# Patient Record
Sex: FEMALE | Race: WHITE | NOT HISPANIC OR LATINO | Employment: PART TIME | URBAN - METROPOLITAN AREA
[De-identification: names, ages, dates, MRNs, and addresses within clinical notes are randomized per-mention and may not be internally consistent; named-entity substitution may affect disease eponyms.]

---
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
## 2021-06-07 ENCOUNTER — OFFICE VISIT (OUTPATIENT)
Dept: INTERNAL MEDICINE | Facility: CLINIC | Age: 21
End: 2021-06-07

## 2021-06-07 VITALS
WEIGHT: 135.7 LBS | DIASTOLIC BLOOD PRESSURE: 62 MMHG | HEART RATE: 69 BPM | SYSTOLIC BLOOD PRESSURE: 130 MMHG | OXYGEN SATURATION: 99 % | HEIGHT: 67 IN | TEMPERATURE: 98.9 F | BODY MASS INDEX: 21.3 KG/M2

## 2021-06-07 DIAGNOSIS — H69.83 DYSFUNCTION OF BOTH EUSTACHIAN TUBES: ICD-10-CM

## 2021-06-07 DIAGNOSIS — Z78.9 ELECTRONIC CIGARETTE USE: ICD-10-CM

## 2021-06-07 DIAGNOSIS — F41.0 GENERALIZED ANXIETY DISORDER WITH PANIC ATTACKS: ICD-10-CM

## 2021-06-07 DIAGNOSIS — K21.9 GASTROESOPHAGEAL REFLUX DISEASE, UNSPECIFIED WHETHER ESOPHAGITIS PRESENT: ICD-10-CM

## 2021-06-07 DIAGNOSIS — R41.3 MEMORY PROBLEM: ICD-10-CM

## 2021-06-07 DIAGNOSIS — Z00.00 HEALTHCARE MAINTENANCE: Primary | ICD-10-CM

## 2021-06-07 DIAGNOSIS — J30.2 SEASONAL ALLERGIC RHINITIS, UNSPECIFIED TRIGGER: ICD-10-CM

## 2021-06-07 DIAGNOSIS — F33.1 MODERATE EPISODE OF RECURRENT MAJOR DEPRESSIVE DISORDER (HCC): ICD-10-CM

## 2021-06-07 DIAGNOSIS — J02.9 PHARYNGITIS, UNSPECIFIED ETIOLOGY: ICD-10-CM

## 2021-06-07 DIAGNOSIS — F41.1 GENERALIZED ANXIETY DISORDER WITH PANIC ATTACKS: ICD-10-CM

## 2021-06-07 PROBLEM — L08.9 WOUND INFECTION: Status: RESOLVED | Noted: 2018-05-30 | Resolved: 2021-06-07

## 2021-06-07 PROBLEM — F90.0 ADHD, PREDOMINANTLY INATTENTIVE TYPE: Status: ACTIVE | Noted: 2020-01-19

## 2021-06-07 PROBLEM — L03.116 CELLULITIS OF KNEE, LEFT: Status: RESOLVED | Noted: 2018-06-01 | Resolved: 2021-06-07

## 2021-06-07 PROBLEM — L02.416 ABSCESS OF LEFT KNEE: Status: RESOLVED | Noted: 2018-05-31 | Resolved: 2021-06-07

## 2021-06-07 PROBLEM — N92.6 ABNORMAL MENSES: Status: ACTIVE | Noted: 2020-01-19

## 2021-06-07 PROBLEM — M25.562 ACUTE PAIN OF LEFT KNEE: Status: RESOLVED | Noted: 2018-05-31 | Resolved: 2021-06-07

## 2021-06-07 PROBLEM — T14.8XXA WOUND INFECTION: Status: RESOLVED | Noted: 2018-05-30 | Resolved: 2021-06-07

## 2021-06-07 PROBLEM — Z97.5 NEXPLANON IN PLACE: Status: ACTIVE | Noted: 2020-05-24

## 2021-06-07 PROBLEM — L03.116 CELLULITIS OF LEFT KNEE: Status: RESOLVED | Noted: 2018-05-31 | Resolved: 2021-06-07

## 2021-06-07 LAB
EXPIRATION DATE: NORMAL
INTERNAL CONTROL: NORMAL
Lab: NORMAL
S PYO AG THROAT QL: NEGATIVE

## 2021-06-07 PROCEDURE — 99385 PREV VISIT NEW AGE 18-39: CPT | Performed by: NURSE PRACTITIONER

## 2021-06-07 PROCEDURE — 87880 STREP A ASSAY W/OPTIC: CPT | Performed by: NURSE PRACTITIONER

## 2021-06-07 RX ORDER — CITALOPRAM 10 MG/1
10 TABLET ORAL DAILY
Qty: 30 TABLET | Refills: 5 | Status: SHIPPED | OUTPATIENT
Start: 2021-06-07 | End: 2021-06-07

## 2021-06-07 RX ORDER — CITALOPRAM 10 MG/1
10 TABLET ORAL DAILY
Qty: 30 TABLET | Refills: 5 | Status: SHIPPED | OUTPATIENT
Start: 2021-06-07

## 2021-06-07 NOTE — PROGRESS NOTES
"Subjective   Socorro Chaney is a 21 y.o. female.     Chief Complaint   Patient presents with   • Anxiety     Pt here to est care as a new pt tand would like to discuss abt anxiety depression.   • Depression        History of Present Illness   She is here today as a new patient to establish care and for CPE. She feels her overall health is \"not the best.\" She tries to eat a healthy, balanced diet during the week. Exercising 3-4 days a week.   Previous PCP was a few years ago.  She has been having a sore throat and bilateral ear pain and fullness for the past several days. 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 She denies any fever, but has had chills and fatigue. She has a hx of seasonal AR, not currently taking medication.    Generalized Anxiety/Major depression- symptoms have been present for some time. She was previously on Wellbutrin with side effects of vivid dreams and zoloft without symptom improvement. She is having some anhedonia and lack of motivation. She does have occasional \"anxiety attacks.\" She has anxiety triggers with current roommate situation and with family. She wakes up several times a night. Denies any suicidal thoughts or michele.     Acid reflux- occurs 3-4 times a week. Tums does not improve symptoms completely. She tries to avoid trigger foods and eating before bed. Has occasional epigastric discomfort. Denies dysphagia, odynophagia, early satiety, unexplained weight loss.     GYN- sees Karina Joaquin with GYN. Nexplanon in place, since 2015. Hx of menstrual irregularities with heavy menstrual bleeding and painful menses. She is still having menses, LMP 05/28/2021. She has never had a pap. Sexually active, using condoms.     She works as a .    The following portions of the patient's history were reviewed and updated as appropriate: allergies, current medications, past family history, past medical history, past social history, past surgical history and problem " list.    Review of Systems   Constitutional: Positive for chills and fatigue. Negative for appetite change, diaphoresis, fever, unexpected weight gain and unexpected weight loss.   HENT: Positive for ear pain, postnasal drip, rhinorrhea and sore throat. Negative for congestion, dental problem, ear discharge, hearing loss, mouth sores, nosebleeds, sinus pressure, swollen glands, tinnitus and trouble swallowing.    Eyes: Negative for blurred vision, pain, discharge, redness, itching and visual disturbance.   Respiratory: Positive for cough (dry). Negative for chest tightness, shortness of breath and wheezing.    Cardiovascular: Negative for chest pain, palpitations and leg swelling.   Gastrointestinal: Positive for indigestion. Negative for abdominal distention, abdominal pain, blood in stool, constipation, diarrhea, nausea, vomiting and GERD.   Endocrine: Negative for cold intolerance and heat intolerance.   Genitourinary: Negative for breast discharge, breast lump, breast pain, difficulty urinating, dyspareunia, dysuria, flank pain, frequency, hematuria, pelvic pain, pelvic pressure, urgency, urinary incontinence, vaginal bleeding and vaginal discharge.   Musculoskeletal: Positive for back pain (chronic associated with scoliosis). Negative for arthralgias, gait problem, joint swelling, myalgias and neck pain.   Skin: Negative for color change, rash and skin lesions.   Allergic/Immunologic: Positive for environmental allergies. Negative for food allergies.   Neurological: Positive for memory problem (foggy thinking, difficulty focusing). Negative for dizziness, tremors, seizures, syncope, speech difficulty, weakness, light-headedness, numbness, headache and confusion.   Hematological: Negative for adenopathy. Does not bruise/bleed easily.   Psychiatric/Behavioral: Positive for sleep disturbance and depressed mood. Negative for suicidal ideas and stress. The patient is nervous/anxious.        Objective   Physical  Exam  Constitutional:       Appearance: Normal appearance. She is well-developed.   HENT:      Head: Normocephalic and atraumatic.      Right Ear: Hearing, ear canal and external ear normal. A middle ear effusion is present.      Left Ear: Hearing, ear canal and external ear normal. A middle ear effusion is present.      Nose: Septal deviation and rhinorrhea present. Rhinorrhea is clear.      Right Sinus: No maxillary sinus tenderness or frontal sinus tenderness.      Left Sinus: No maxillary sinus tenderness or frontal sinus tenderness.      Mouth/Throat:      Lips: Pink.      Mouth: Mucous membranes are moist.      Dentition: Normal dentition.      Tongue: No lesions.      Pharynx: Oropharynx is clear. Uvula midline.      Tonsils: No tonsillar exudate.      Comments: Posterior pharynx with cobbling    Eyes:      General: Lids are normal.      Extraocular Movements: Extraocular movements intact.      Conjunctiva/sclera: Conjunctivae normal.      Pupils: Pupils are equal, round, and reactive to light.   Neck:      Thyroid: No thyroid mass, thyromegaly or thyroid tenderness.      Vascular: No carotid bruit.      Trachea: Trachea normal.   Cardiovascular:      Rate and Rhythm: Normal rate and regular rhythm.      Pulses: Normal pulses.           Radial pulses are 2+ on the right side and 2+ on the left side.        Popliteal pulses are 2+ on the right side and 2+ on the left side.        Dorsalis pedis pulses are 2+ on the right side and 2+ on the left side.        Posterior tibial pulses are 2+ on the right side and 2+ on the left side.      Heart sounds: S1 normal and S2 normal.   Pulmonary:      Effort: Pulmonary effort is normal.      Breath sounds: Normal breath sounds.   Abdominal:      General: Bowel sounds are normal. There is no distension or abdominal bruit.      Palpations: Abdomen is soft. There is no hepatomegaly or splenomegaly.      Tenderness: There is no abdominal tenderness.      Hernia: No hernia is  present.   Musculoskeletal:      Cervical back: Normal range of motion and neck supple.      Right lower leg: No edema.      Left lower leg: No edema.   Lymphadenopathy:      Head:      Right side of head: No submental, submandibular, tonsillar or occipital adenopathy.      Left side of head: No submental, submandibular, tonsillar or occipital adenopathy.      Cervical: No cervical adenopathy.      Upper Body:      Right upper body: No supraclavicular adenopathy.      Left upper body: No supraclavicular adenopathy.      Lower Body: No right inguinal adenopathy. No left inguinal adenopathy.   Skin:     General: Skin is warm and dry.      Findings: No rash.      Nails: There is no clubbing.   Neurological:      Mental Status: She is alert and oriented to person, place, and time.      Cranial Nerves: Cranial nerves are intact.      Motor: Motor function is intact.      Coordination: Coordination is intact.      Gait: Gait is intact.      Deep Tendon Reflexes:      Reflex Scores:       Patellar reflexes are 2+ on the right side and 2+ on the left side.  Psychiatric:         Attention and Perception: Attention normal.         Mood and Affect: Mood and affect normal.         Speech: Speech normal.         Behavior: Behavior normal.         Thought Content: Thought content normal.         Cognition and Memory: Cognition normal.         Vitals:    06/07/21 1328   BP: 130/62   Pulse: 69   Temp: 98.9 °F (37.2 °C)   SpO2: 99%      Body mass index is 21.25 kg/m².    Assessment/Plan   Diagnoses and all orders for this visit:    1. Healthcare maintenance (Primary)  -     Discontinue: citalopram (CeleXA) 10 MG tablet; Take 1 tablet by mouth Daily.  Dispense: 30 tablet; Refill: 5  -     CBC & Differential  -     Comprehensive Metabolic Panel  -     Lipid Panel With LDL / HDL Ratio  -     TSH Rfx On Abnormal To Free T4  -     Vitamin B12    2. Memory problem  -     Discontinue: citalopram (CeleXA) 10 MG tablet; Take 1 tablet by  mouth Daily.  Dispense: 30 tablet; Refill: 5  -     CBC & Differential  -     Comprehensive Metabolic Panel  -     Lipid Panel With LDL / HDL Ratio  -     TSH Rfx On Abnormal To Free T4  -     Vitamin B12    3. Pharyngitis, unspecified etiology  -     Discontinue: citalopram (CeleXA) 10 MG tablet; Take 1 tablet by mouth Daily.  Dispense: 30 tablet; Refill: 5  -     CBC & Differential  -     Comprehensive Metabolic Panel  -     Lipid Panel With LDL / HDL Ratio  -     TSH Rfx On Abnormal To Free T4  -     Vitamin B12  -     POCT rapid strep A    4. Generalized anxiety disorder with panic attacks  -     Discontinue: citalopram (CeleXA) 10 MG tablet; Take 1 tablet by mouth Daily.  Dispense: 30 tablet; Refill: 5  -     CBC & Differential  -     Comprehensive Metabolic Panel  -     Lipid Panel With LDL / HDL Ratio  -     TSH Rfx On Abnormal To Free T4  -     Vitamin B12  -     citalopram (CeleXA) 10 MG tablet; Take 1 tablet by mouth Daily.  Dispense: 30 tablet; Refill: 5    5. Moderate episode of recurrent major depressive disorder (CMS/HCC)  -     Discontinue: citalopram (CeleXA) 10 MG tablet; Take 1 tablet by mouth Daily.  Dispense: 30 tablet; Refill: 5  -     CBC & Differential  -     Comprehensive Metabolic Panel  -     Lipid Panel With LDL / HDL Ratio  -     TSH Rfx On Abnormal To Free T4  -     Vitamin B12  -     citalopram (CeleXA) 10 MG tablet; Take 1 tablet by mouth Daily.  Dispense: 30 tablet; Refill: 5    6. Seasonal allergic rhinitis, unspecified trigger    7. Dysfunction of both eustachian tubes    8. Electronic cigarette use    9. Gastroesophageal reflux disease, unspecified whether esophagitis present        Patient screened positive for depression based on a PHQ-9 score of 16 on 6/7/2021. Follow-up recommendations include: Prescribed antidepressant medication treatment.    1. Preventative counseling- encouraged her to continue to work on healthy, balanced diet and regular exercise with goal of 150 minutes  moderate intensity activity a week. Ensure adequate dietary intake of calcium and vit D. Limit alcohol consumption to 1 or fewer drinks a day.  2. Generalized anxiety/Major depression- not controlled. Will try citalopram 10 mg once daily. Aware of appropriate use and a/e. To ER with SI or michele. F/u in 3-4 weeks for medication check.  3. Seasonal AR/Eustachian tube dysfunction- strep test negative. I suspect sore throat is related to PND. Recommend starting NSS 2 puffs daily. Can also try addition of antihistamine at bedtime. Notify for worsening symptoms.  4.Electronic cigarette use- encouraged her to quit. She is ready to do so. Discussed the effects on her AR and acid reflux.   5. Acid reflux- recommend trying daily omeprazole for 3-4 weeks, avoid trigger foods and discussed reflux precautions. Encouraged smoking and alcohol cessation. Notify if symptoms do not improve.    6. Memory problem- check labs today. ?side effect of anxiety and depression.    Dentist- UTD  GYN- due, she will schedule this  Wears sunscreen outside  “Discussed risks/benefits to vaccination, reviewed components of the vaccine, discussed VIS, discussed informed consent, informed consent obtained. Patient/Parent was allowed to accept or refuse vaccine. Questions answered to satisfactory state of patient/Parent. We reviewed typical age appropriate and seasonally appropriate vaccinations. Reviewed immunization history and updated state vaccination form as needed. Patient was counseled on HPV  Tdap  COVID    Non-fasting labs today. F/u in 3 weeks for anxiety/depression.

## 2021-06-08 LAB
ALBUMIN SERPL-MCNC: 5 G/DL (ref 3.5–5.2)
ALBUMIN/GLOB SERPL: 2.6 G/DL
ALP SERPL-CCNC: 75 U/L (ref 39–117)
ALT SERPL-CCNC: 17 U/L (ref 1–33)
AST SERPL-CCNC: 15 U/L (ref 1–32)
BASOPHILS # BLD AUTO: 0.06 10*3/MM3 (ref 0–0.2)
BASOPHILS NFR BLD AUTO: 0.9 % (ref 0–1.5)
BILIRUB SERPL-MCNC: 0.3 MG/DL (ref 0–1.2)
BUN SERPL-MCNC: 15 MG/DL (ref 6–20)
BUN/CREAT SERPL: 21.1 (ref 7–25)
CALCIUM SERPL-MCNC: 9.9 MG/DL (ref 8.6–10.5)
CHLORIDE SERPL-SCNC: 104 MMOL/L (ref 98–107)
CHOLEST SERPL-MCNC: 181 MG/DL (ref 0–200)
CO2 SERPL-SCNC: 25.7 MMOL/L (ref 22–29)
CREAT SERPL-MCNC: 0.71 MG/DL (ref 0.57–1)
EOSINOPHIL # BLD AUTO: 0.25 10*3/MM3 (ref 0–0.4)
EOSINOPHIL NFR BLD AUTO: 3.8 % (ref 0.3–6.2)
ERYTHROCYTE [DISTWIDTH] IN BLOOD BY AUTOMATED COUNT: 11.8 % (ref 12.3–15.4)
GLOBULIN SER CALC-MCNC: 1.9 GM/DL
GLUCOSE SERPL-MCNC: 87 MG/DL (ref 65–99)
HCT VFR BLD AUTO: 43.1 % (ref 34–46.6)
HDLC SERPL-MCNC: 61 MG/DL (ref 40–60)
HGB BLD-MCNC: 14.5 G/DL (ref 12–15.9)
IMM GRANULOCYTES # BLD AUTO: 0.02 10*3/MM3 (ref 0–0.05)
IMM GRANULOCYTES NFR BLD AUTO: 0.3 % (ref 0–0.5)
LDLC SERPL CALC-MCNC: 103 MG/DL (ref 0–100)
LDLC/HDLC SERPL: 1.67 {RATIO}
LYMPHOCYTES # BLD AUTO: 1.58 10*3/MM3 (ref 0.7–3.1)
LYMPHOCYTES NFR BLD AUTO: 24 % (ref 19.6–45.3)
MCH RBC QN AUTO: 30.9 PG (ref 26.6–33)
MCHC RBC AUTO-ENTMCNC: 33.6 G/DL (ref 31.5–35.7)
MCV RBC AUTO: 91.9 FL (ref 79–97)
MONOCYTES # BLD AUTO: 0.44 10*3/MM3 (ref 0.1–0.9)
MONOCYTES NFR BLD AUTO: 6.7 % (ref 5–12)
NEUTROPHILS # BLD AUTO: 4.22 10*3/MM3 (ref 1.7–7)
NEUTROPHILS NFR BLD AUTO: 64.3 % (ref 42.7–76)
NRBC BLD AUTO-RTO: 0 /100 WBC (ref 0–0.2)
PLATELET # BLD AUTO: 305 10*3/MM3 (ref 140–450)
POTASSIUM SERPL-SCNC: 4 MMOL/L (ref 3.5–5.2)
PROT SERPL-MCNC: 6.9 G/DL (ref 6–8.5)
RBC # BLD AUTO: 4.69 10*6/MM3 (ref 3.77–5.28)
SODIUM SERPL-SCNC: 136 MMOL/L (ref 136–145)
TRIGL SERPL-MCNC: 91 MG/DL (ref 0–150)
TSH SERPL DL<=0.005 MIU/L-ACNC: 1.58 UIU/ML (ref 0.27–4.2)
VIT B12 SERPL-MCNC: 315 PG/ML (ref 211–946)
VLDLC SERPL CALC-MCNC: 17 MG/DL (ref 5–40)
WBC # BLD AUTO: 6.57 10*3/MM3 (ref 3.4–10.8)

## 2021-06-10 ENCOUNTER — TELEPHONE (OUTPATIENT)
Dept: INTERNAL MEDICINE | Facility: CLINIC | Age: 21
End: 2021-06-10

## 2021-06-24 ENCOUNTER — HOSPITAL ENCOUNTER (EMERGENCY)
Facility: HOSPITAL | Age: 21
Discharge: HOME | End: 2021-06-24
Attending: EMERGENCY MEDICINE | Admitting: EMERGENCY MEDICINE

## 2021-06-24 VITALS
HEART RATE: 78 BPM | OXYGEN SATURATION: 100 % | SYSTOLIC BLOOD PRESSURE: 101 MMHG | WEIGHT: 130 LBS | HEIGHT: 67 IN | BODY MASS INDEX: 20.4 KG/M2 | TEMPERATURE: 98.8 F | RESPIRATION RATE: 16 BRPM | DIASTOLIC BLOOD PRESSURE: 76 MMHG

## 2021-06-24 DIAGNOSIS — M94.0 TIETZE SYNDROME: Primary | ICD-10-CM

## 2021-06-24 LAB — QT INTERVAL: 378 MS

## 2021-06-24 PROCEDURE — 99283 EMERGENCY DEPT VISIT LOW MDM: CPT

## 2021-06-24 PROCEDURE — 93010 ELECTROCARDIOGRAM REPORT: CPT | Performed by: INTERNAL MEDICINE

## 2021-06-24 PROCEDURE — 93005 ELECTROCARDIOGRAM TRACING: CPT

## 2021-06-24 RX ORDER — LIDOCAINE 50 MG/G
1 PATCH TOPICAL ONCE
Status: DISCONTINUED | OUTPATIENT
Start: 2021-06-24 | End: 2021-06-24 | Stop reason: HOSPADM

## 2021-06-24 RX ORDER — LIDOCAINE 50 MG/G
1 PATCH TOPICAL EVERY 24 HOURS
Qty: 30 PATCH | Refills: 0 | Status: SHIPPED | OUTPATIENT
Start: 2021-06-24

## 2021-06-24 RX ORDER — IBUPROFEN 600 MG/1
600 TABLET ORAL EVERY 6 HOURS PRN
Qty: 30 TABLET | Refills: 0 | Status: SHIPPED | OUTPATIENT
Start: 2021-06-24

## 2021-06-24 RX ADMIN — LIDOCAINE 1 PATCH: 50 PATCH TOPICAL at 20:22

## 2022-05-13 ENCOUNTER — TELEPHONE (OUTPATIENT)
Dept: INTERNAL MEDICINE | Facility: CLINIC | Age: 22
End: 2022-05-13

## 2022-05-18 ENCOUNTER — TELEPHONE (OUTPATIENT)
Dept: INTERNAL MEDICINE | Facility: CLINIC | Age: 22
End: 2022-05-18

## 2022-05-18 DIAGNOSIS — F41.1 GENERALIZED ANXIETY DISORDER WITH PANIC ATTACKS: Primary | ICD-10-CM

## 2022-05-18 DIAGNOSIS — F41.0 GENERALIZED ANXIETY DISORDER WITH PANIC ATTACKS: Primary | ICD-10-CM

## 2022-05-18 NOTE — TELEPHONE ENCOUNTER
----- Message from ROBERT Ivan sent at 5/18/2022  8:09 AM EDT -----  Ok to place referral to [facility].   ----- Message -----  From: Velia Emerson  Sent: 5/18/2022   7:07 AM EDT  To: ROBERT Ivan      ----- Message -----  From: Alma Rosa Mckeon RegSched Rep  Sent: 5/17/2022   3:49 PM EDT  To: Velia Emerson    Mother called on 05/13/22:      PATIENTS MOTHER CALLED IN TO REQUEST A REFERRAL TO [facility] FOR A THERAPIST.

## 2023-07-26 ENCOUNTER — OFFICE VISIT (OUTPATIENT)
Dept: INTERNAL MEDICINE | Facility: CLINIC | Age: 23
End: 2023-07-26
Payer: COMMERCIAL

## 2023-07-26 VITALS
SYSTOLIC BLOOD PRESSURE: 110 MMHG | BODY MASS INDEX: 22.38 KG/M2 | OXYGEN SATURATION: 98 % | TEMPERATURE: 98.3 F | HEART RATE: 98 BPM | WEIGHT: 142.6 LBS | DIASTOLIC BLOOD PRESSURE: 62 MMHG | HEIGHT: 67 IN

## 2023-07-26 DIAGNOSIS — H60.393 OTHER INFECTIVE ACUTE OTITIS EXTERNA OF BOTH EARS: ICD-10-CM

## 2023-07-26 DIAGNOSIS — K59.00 CONSTIPATION, UNSPECIFIED CONSTIPATION TYPE: ICD-10-CM

## 2023-07-26 DIAGNOSIS — M54.2 NECK PAIN: Primary | ICD-10-CM

## 2023-07-26 PROBLEM — B00.9 HSV-1 (HERPES SIMPLEX VIRUS 1) INFECTION: Chronic | Status: ACTIVE | Noted: 2021-09-29

## 2023-07-26 PROCEDURE — 99214 OFFICE O/P EST MOD 30 MIN: CPT | Performed by: NURSE PRACTITIONER

## 2023-07-26 RX ORDER — CIPROFLOXACIN AND DEXAMETHASONE 3; 1 MG/ML; MG/ML
4 SUSPENSION/ DROPS AURICULAR (OTIC) 2 TIMES DAILY
Qty: 1 EACH | Refills: 0 | Status: SHIPPED | OUTPATIENT
Start: 2023-07-26

## 2023-07-26 NOTE — PROGRESS NOTES
Subjective   Socorro Chaney is a 23 y.o. female.     Chief Complaint   Patient presents with    Neck Pain     Pt c/o pain on base of neck and get worse with different movements X 1.5-2 months.    Abdominal Pain    Constipation     Pt c/o constipation and abdominal pain X 1 year.        History of Present Illness   She is here today with complaints of neck pain.  Symptoms started approximately 2 months ago. She initially thought that she had slept wrong and tried different pillows and positions without improvement. Pain is constant, worse with flexion and extension of the neck. Pain is worse in the morning, improves some throughout the the day. Pain will occasionally radiate down to the sides of the neck and between shoulder blades.   She tried ibuprofen once with minimal improvement.   She denies any trauma, paresthesias, extremity weakness.     She also notes constipation and intermittent abdominal discomfort. This started approximately 1 year ago. She notes alternating bowel habits with constipation and occasional episodes of diarrhea. She notes lower abdominal cramping with BM. She will typically have a BM daily. This can be hard associated with straining or very soft and occur shortly after eating.  She notes wine is a trigger food.  She feels like constipation is worse when eating dairy.  She denies any BRBPR, melena.    She also has been having bilateral ear itching and drainage. She notes some aching in the morning.  Symptoms started approximately 1 month ago in the right ear and then progressed to the left ear.  She has been swimming often and [place] the [place].  She typically wears ear buds at work, but has transition to wearing headphones. She notes some allergy symptoms. She has tried peroxide without improvement.     The following portions of the patient's history were reviewed and updated as appropriate: allergies, current medications, past family history, past medical history, past social history,  past surgical history, and problem list.    Review of Systems   Constitutional:  Negative for chills, fatigue and fever.   HENT:  Positive for congestion, ear discharge, ear pain and postnasal drip. Negative for hearing loss.    Respiratory: Negative.     Cardiovascular: Negative.    Gastrointestinal:  Positive for abdominal distention, abdominal pain, constipation and diarrhea. Negative for anal bleeding, blood in stool, nausea and vomiting.   Musculoskeletal:  Positive for neck pain.   Neurological:  Negative for weakness and numbness.     Objective   Physical Exam  Constitutional:       Appearance: She is well-developed.   HENT:      Head: Normocephalic and atraumatic.      Right Ear: Hearing normal. Drainage present.      Left Ear: Hearing normal. Drainage present.      Ears:      Comments: Fluid present bilateral TM.     Nose: Rhinorrhea present. Rhinorrhea is clear.      Right Turbinates: Not enlarged.      Left Turbinates: Not enlarged.      Mouth/Throat:      Lips: Pink.      Mouth: Mucous membranes are moist. No injury or oral lesions.      Dentition: Normal dentition.      Tongue: No lesions. Tongue does not deviate from midline.      Palate: No mass and lesions.      Pharynx: Uvula midline. No pharyngeal swelling, oropharyngeal exudate, posterior oropharyngeal erythema or uvula swelling.      Comments: Posterior pharynx with clear drainage.  Neck:      Thyroid: No thyroid mass, thyromegaly or thyroid tenderness.      Vascular: No carotid bruit.      Trachea: Trachea normal.   Cardiovascular:      Rate and Rhythm: Normal rate and regular rhythm.      Chest Wall: PMI is not displaced.      Pulses:           Radial pulses are 2+ on the right side and 2+ on the left side.        Dorsalis pedis pulses are 2+ on the right side and 2+ on the left side.        Posterior tibial pulses are 2+ on the right side and 2+ on the left side.      Heart sounds: S1 normal and S2 normal.   Pulmonary:      Effort: Pulmonary  effort is normal.      Breath sounds: Normal breath sounds.   Abdominal:      General: Bowel sounds are normal. There is no distension or abdominal bruit. There are no signs of injury.      Palpations: Abdomen is soft. There is no hepatomegaly or splenomegaly.      Tenderness: There is no abdominal tenderness. There is no guarding or rebound. Negative signs include Delgado's sign.      Hernia: No hernia is present.   Musculoskeletal:      Cervical back: Tenderness present. No swelling, edema, deformity, erythema, signs of trauma, lacerations, rigidity, spasms, torticollis, bony tenderness or crepitus. Pain with movement present. Normal range of motion.      Thoracic back: Normal.      Lumbar back: Normal.      Right lower leg: No edema.      Left lower leg: No edema.   Lymphadenopathy:      Head:      Right side of head: No submental, submandibular, tonsillar or occipital adenopathy.      Left side of head: No submental, submandibular, tonsillar or occipital adenopathy.      Cervical: No cervical adenopathy.   Skin:     General: Skin is warm and dry.      Capillary Refill: Capillary refill takes less than 2 seconds.      Nails: There is no clubbing.   Neurological:      Mental Status: She is alert and oriented to person, place, and time.   Psychiatric:         Attention and Perception: Attention normal.         Mood and Affect: Mood and affect normal.         Speech: Speech normal.         Behavior: Behavior normal.         Thought Content: Thought content normal.         Cognition and Memory: Cognition normal.       Vitals:    07/26/23 0901   BP: 110/62   Pulse: 98   Temp: 98.3 °F (36.8 °C)   SpO2: 98%      Body mass index is 22.33 kg/m².    Assessment & Plan   Problems Addressed this Visit    None  Visit Diagnoses       Neck pain    -  Primary    Relevant Orders    Ambulatory Referral to Physical Therapy Evaluate and treat    Other infective acute otitis externa of both ears        Relevant Medications     ciprofloxacin-dexamethasone (Ciprodex) 0.3-0.1 % otic suspension    Constipation, unspecified constipation type              Diagnoses         Codes Comments    Neck pain    -  Primary ICD-10-CM: M54.2  ICD-9-CM: 723.1     Other infective acute otitis externa of both ears     ICD-10-CM: H60.393  ICD-9-CM: 380.10     Constipation, unspecified constipation type     ICD-10-CM: K59.00  ICD-9-CM: 564.00           1.  Neck pain-recommend starting NSAIDs daily for 2 weeks.  Take with food, hydrate well with fluids.  Recommend application of heat and ice, gentle stretching.  Referral placed to physical therapy for further evaluation.  If no improvement with conservative treatment recommend x-ray imaging of the cervical spine.  2.  Constipation-?  IBS.  Recommend starting a daily fiber supplement and probiotic and increasing water intake.  Recommend keeping a food diary to identify trigger foods.  Also discussed trying elimination diet from dairy to see if symptoms improve.  Follow-up in 4 to 6 weeks.  If no improvement recommend referral to GI.  3.  Infective acute otitis externa of both ears-start Ciprodex otic suspension 4 drops in both the ears x7 days.  Also recommend starting a nasal steroid spray will 1 to 2 puffs in each nostril daily.  Follow-up in 4 to 6 weeks.

## 2023-08-07 ENCOUNTER — TREATMENT (OUTPATIENT)
Dept: PHYSICAL THERAPY | Facility: CLINIC | Age: 23
End: 2023-08-07
Payer: COMMERCIAL

## 2023-08-07 DIAGNOSIS — M54.6 CHRONIC BILATERAL THORACIC BACK PAIN: ICD-10-CM

## 2023-08-07 DIAGNOSIS — G89.29 CHRONIC BILATERAL THORACIC BACK PAIN: ICD-10-CM

## 2023-08-07 DIAGNOSIS — M54.2 PAIN, NECK: Primary | ICD-10-CM

## 2023-08-07 NOTE — PROGRESS NOTES
"Physical Therapy Initial Evaluation and Plan of Care      [address] [place]   [address]    Patient: Socorro Chaney   : 2000  Diagnosis/ICD-10 Code:  Pain, neck [M54.2]; chronic (B) thoracic back pain M54.6  Referring practitioner: ROBERT Ivan  Date of Initial Visit: 2023  Today's Date: 2023  Patient seen for 1 sessions           Subjective Questionnaire: NDI:13/50 indicates 26% functional impairment       Subjective Evaluation    History of Present Illness  Onset date: about 2 months ago.  Mechanism of injury: - Pt report onset of neck pain about 2 months ago  - Pt reported she began gradually noticing pain which she originally attributed to camping or sleep position  - Pt has h/o of  earache, ear infection, fluid in ears, difficulty hearing from (L) ear.   - Headaches tend to be frontal and temporal areas.  - Suboccipital pain \" is achy\".   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  Patient wears headset for making call Pain  Current pain rating: 3  At best pain ratin  At worst pain ratin  Location: posterior neck; intermittent scapular pain, mid back pain  Aggravating factors: sleeping and prolonged positioning (reading, prolonged positioning, worst first thing in the morning)    Social Support  Lives in: apartment  Lives with: roommates.       Copied from 2019 x ray results:    IMPRESSION:   1. Mild thoracic scoliosis.   2. Known right L5 pars interarticularis defect, somewhat less than optimally displayed on the current study.   3. Stable mild degenerative disc disease at L4-L5.   PMHx: anxiety, depression, spondylolisthesis  Objective          Static Posture     Head  Forward.    Scapulae  Left winging, right winging and right protracted.    Comments  Pt appears to have flattening of " cervical lordosis    Postural Observations  Correction of posture: makes symptoms better    Additional Postural Observation Details  With lumbar support cushion and towel roll at thoracic spine     Palpation   Left   Hypertonic in the cervical paraspinals, scalenes, sternocleidomastoid and upper trapezius.   Tenderness of the cervical paraspinals, scalenes, sternocleidomastoid and upper trapezius.     Right   Hypertonic in the cervical paraspinals, scalenes, sternocleidomastoid and upper trapezius. Tenderness of the cervical paraspinals, scalenes, sternocleidomastoid and upper trapezius.     Active Range of Motion   Cervical/Thoracic Spine   Cervical    Flexion: WFL and with pain  Extension: 70 degrees   Left lateral flexion: 31 degrees with pain  Right lateral flexion: 35 degrees   Left rotation: 73 degrees   Right rotation: 70 degrees with pain    Additional Active Range of Motion Details  (B) shoulder AROM grossly WNL    Strength/Myotome Testing     Left Shoulder     Planes of Motion   Flexion: 4   Abduction: 4+     Isolated Muscles   Lower trapezius: 3   Middle trapezius: 3+     Right Shoulder     Planes of Motion   Flexion: 4   Abduction: 4+     Isolated Muscles   Lower trapezius: 3   Middle trapezius: 3+     Left Elbow   Flexion: 5  Extension: 5    Right Elbow   Flexion: 5  Extension: 5        Assessment & Plan     Assessment  Impairments: abnormal muscle tone, abnormal or restricted ROM, impaired physical strength, lacks appropriate home exercise program and pain with function  Functional Limitations: carrying objects, lifting, sleeping, uncomfortable because of pain, moving in bed, sitting and unable to perform repetitive tasks  Assessment details: Pt presents to PT with medical diagnosis of  neck pain  .  Patient reported gradual onset about 2 months ago.    Due to impairments and recent pain patterns, pt having difficulty sleeping, reading, looking at phone and performing job duties (including primary use  of computer)     Pt's job consists of sitting at a computer with multiple monitors.  Therapist discussed proper ergonomic set up of work station including monitor at neutral/eye level to avoid prolonged rotation, proper chair height, hip/knees at 90/90 position and lumbar support.  Patient verbalized understanding.       Pt would benefit from skilled PT intervention to address the deficits noted to maximize function, decrease pain and improve restorative sleep pattern .        Barriers to therapy: job related duties/positioning; chronic spine pain  Prognosis: good    Goals  Plan Goals: SHORT TERM GOALS: Time for goal Achievement: 3 weeks  1. Pt can demonstrate initial HEP for self management of impairments.  2. Pt reports at least 50% improvement overall, improved neck AROM and less pain.  3. Pt reports improve quality sleep, less interference from neck pain with ability to sleep 6-8 hours uninterrupted by pain     LONG TERM  GOALS: Time for goal achievement: 6 weeks  1. NDI score <19% by DC indicating improved function.  2. Neck rotation improved to 80 degrees or better for improved safety when driving.  3. Pt reports she  is ready for DC to Independent HEP for self management of her condition and to prevent recurrence   4.Pt will demonstrate (B) mid and lower trap strength = 4/5 for improved postural support and decreased spinal stress    Plan  Therapy options: will be seen for skilled therapy services  Planned modality interventions: cryotherapy, hydrotherapy, electrical stimulation/Russian stimulation, high voltage pulsed current (pain management), ultrasound, dry needling and traction  Other planned modality interventions: mechanical cervical traction  Planned therapy interventions: manual therapy, joint mobilization, home exercise program, functional ROM exercises, flexibility, strengthening, spinal/joint mobilization, soft tissue mobilization, postural training, therapeutic activities, stretching,  neuromuscular re-education and body mechanics training  Frequency: 1x week  Duration in visits: 6  Duration in weeks: 6  Treatment plan discussed with: patient  Plan details: Trialed dry needling this date for efficacy of pain/symptom reduction.        History # of Personal Factors and/or Comorbidities: MODERATE (1-2)  Examination of Body System(s): # of elements: MODERATE (3)  Clinical Presentation: EVOLVING  Clinical Decision Making: MODERATE      Timed:         Manual Therapy:    0     mins  64570;     Therapeutic Exercise:    0     mins  75866;     Neuromuscular Keyla:    0    mins  73338;    Therapeutic Activity:     0     mins  27937;     Gait Trainin     mins  72887;     Ultrasound:     0     mins  33914;    Ionto                               0    mins   17349  Self Care                       0     mins   81733  Aquatic                          0     mins 97445      Un-Timed:  Electrical Stimulation:    0     mins  32290 ( );  Dry Needling     0     mins self-pay  Traction     0     mins 94711  Low Eval     0     Mins  52524  Mod Eval     40     Mins  72185  High Eval                       0     Mins  29168  Re-Eval                           0    mins  48785  Dry Needling Trial 15 mins DRYNDLTRIAL      Timed Treatment:   0   mins   Total Treatment:     55   mins    PT SIGNATURE: Mey Mccauley PT   [place] License#: [id number]      Electronically signed by Mey Mccauley PT, 23, 3:01 PM EDT      Initial Certification  Certification Period:  2023 thru 2023  I certify that the therapy services are furnished while this patient is under my care.  The services outlined above are required by this patient, and will be reviewed every 90 days.       Physician Signature:__________________________________________________    PHYSICIAN: Parvin Cehung APRN      DATE:     Please sign and return via fax to [phone].. Thank you, [facility].

## 2023-08-14 ENCOUNTER — TREATMENT (OUTPATIENT)
Dept: PHYSICAL THERAPY | Facility: CLINIC | Age: 23
End: 2023-08-14
Payer: COMMERCIAL

## 2023-08-14 DIAGNOSIS — M54.2 PAIN, NECK: Primary | ICD-10-CM

## 2023-08-14 DIAGNOSIS — M54.6 CHRONIC BILATERAL THORACIC BACK PAIN: ICD-10-CM

## 2023-08-14 DIAGNOSIS — G89.29 CHRONIC BILATERAL THORACIC BACK PAIN: ICD-10-CM

## 2023-08-14 NOTE — PROGRESS NOTES
Physical Therapy Treatment  Note  [address] [place]   [address]     Diagnosis Plan   1. Pain, neck        2. Chronic bilateral thoracic back pain            VISIT#: 2    Subjective   Socorro Chaney reports: waking with increased pain today.  Patient rated neck pain and in between shoulder blades as 5-6/10       Objective     See Exercise, Manual, and Modality Logs for complete treatment.     Patient Education:  Access Code: [email or web address]  URL: [email or web address]  Date: 2023  Prepared by: Mey Mccauley    Exercises  - Sternocleidomastoid Stretch  - 1 x daily - 7 x weekly - 3 sets - 20 hold    Additional HEP exercises as noted on flow sheet  Goals  Plan Goals: SHORT TERM GOALS: Time for goal Achievement: 3 weeks  1. Pt can demonstrate initial HEP for self management of impairments.  2. Pt reports at least 50% improvement overall, improved neck AROM and less pain.  3. Pt reports improve quality sleep, less interference from neck pain with ability to sleep 6-8 hours uninterrupted by pain      LONG TERM  GOALS: Time for goal achievement: 6 weeks  1. NDI score <19% by DC indicating improved function.  2. Neck rotation improved to 80 degrees or better for improved safety when driving.  3. Pt reports she  is ready for DC to Independent HEP for self management of her condition and to prevent recurrence   4.Pt will demonstrate (B) mid and lower trap strength = 4/5 for improved postural support and decreased spinal stress    Assessment/Plan  - HEP handout issued this date.  Patient was able to return demo of all exercises  - Pt denied increased pain at conclusion of session but reported her muscles felt as if she had done something.   Progress per Plan of Care            Timed:         Manual Therapy:    0     mins  64719;     Therapeutic Exercise:    40     mins  34364;     Neuromuscular Keyla:    0    mins  56324;    Therapeutic Activity:     0     mins  14981;     Gait Trainin     mins   69206;     Ultrasound:     0     mins  39816;    Ionto                               0    mins   67422  Self Care                       0     mins   52182  Canalith Repos               0    mins  4209  Aquatic                          0     mins 95677    Un-Timed:  Electrical Stimulation:    0     mins  55857 ( );  Dry Needling     0     mins self-pay  Traction     0     mins 42681  Low Eval     0     Mins  85252  Mod Eval     0     Mins  87801  High Eval                       0     Mins  92941  Re-Eval                           0    mins  30267    Timed Treatment:   40   mins   Total Treatment:     40   mins    Mey Mccauley, PT PT, DPT, [id number]

## 2023-08-21 ENCOUNTER — TREATMENT (OUTPATIENT)
Dept: PHYSICAL THERAPY | Facility: CLINIC | Age: 23
End: 2023-08-21
Payer: COMMERCIAL

## 2023-08-21 DIAGNOSIS — M54.2 PAIN, NECK: Primary | ICD-10-CM

## 2023-08-21 DIAGNOSIS — M54.6 CHRONIC BILATERAL THORACIC BACK PAIN: ICD-10-CM

## 2023-08-21 DIAGNOSIS — G89.29 CHRONIC BILATERAL THORACIC BACK PAIN: ICD-10-CM

## 2023-08-21 NOTE — PROGRESS NOTES
Physical Therapy Treatment  Note  [address] [place]   [address]     Diagnosis Plan   1. Pain, neck        2. Chronic bilateral thoracic back pain            VISIT#: 3    Subjective   Socorro Chaney reports: pain is improving with exception of when she is sitting at work station at home.      Objective     See Exercise, Manual, and Modality Logs for complete treatment.     Goals  Plan Goals: SHORT TERM GOALS: Time for goal Achievement: 3 weeks  1. Pt can demonstrate initial HEP for self management of impairments.  2. Pt reports at least 50% improvement overall, improved neck AROM and less pain.  3. Pt reports improve quality sleep, less interference from neck pain with ability to sleep 6-8 hours uninterrupted by pain      LONG TERM  GOALS: Time for goal achievement: 6 weeks  1. NDI score <19% by DC indicating improved function.  2. Neck rotation improved to 80 degrees or better for improved safety when driving.  3. Pt reports she  is ready for DC to Independent HEP for self management of her condition and to prevent recurrence   4.Pt will demonstrate (B) mid and lower trap strength = 4/5 for improved postural support and decreased spinal stress    Assessment/Plan  -Addition stretching and strengthening activities added this date to address noted impairments.  - Pt denied pain at conclusion of session except when turning head to right.  Pt rated pain as 2-3/10 when turning head in this direction   - Pt reported fatigue of upper extremities with strengthening activities.  Encouraged pt to add prone and row scapular strengthening to fitness routine.   Progress per Plan of Care            Timed:         Manual Therapy:    15     mins  47825;     Therapeutic Exercise:    30     mins  59375;     Neuromuscular Keyla:    0    mins  41926;    Therapeutic Activity:     0     mins  64819;     Gait Trainin     mins  36696;     Ultrasound:     0     mins  67555;    Ionto                               0     mins   13338  Self Care                       0     mins   25162  Canalith Repos               0    mins  4209  Aquatic                          0     mins 04552    Un-Timed:  Electrical Stimulation:    0     mins  50427 ( );  Dry Needling     0     mins self-pay  Traction     0     mins 93383  Low Eval     0     Mins  88771  Mod Eval     0     Mins  24488  High Eval                       0     Mins  72111  Re-Eval                           0    mins  36854    Timed Treatment:   45   mins   Total Treatment:     45   mins    Mey Mccauley, PT PT, DPT, [id number]

## 2023-08-28 ENCOUNTER — TREATMENT (OUTPATIENT)
Dept: PHYSICAL THERAPY | Facility: CLINIC | Age: 23
End: 2023-08-28
Payer: COMMERCIAL

## 2023-08-28 DIAGNOSIS — G89.29 CHRONIC BILATERAL THORACIC BACK PAIN: ICD-10-CM

## 2023-08-28 DIAGNOSIS — M54.6 CHRONIC BILATERAL THORACIC BACK PAIN: ICD-10-CM

## 2023-08-28 DIAGNOSIS — M54.2 PAIN, NECK: Primary | ICD-10-CM

## 2023-08-28 NOTE — PROGRESS NOTES
Physical Therapy Treatment  Note  [address] [place]   [address]     Diagnosis Plan   1. Pain, neck        2. Chronic bilateral thoracic back pain            VISIT#: 4    Subjective   Socorro Chaney reports: pain provocation occurs when first waking in the morning and with cervical rotation.  Pain levels up to 4-5/10.  Patient denied pain upon arrival to therapy.     Objective     See Exercise, Manual, and Modality Logs for complete treatment.     Goals  Plan Goals: SHORT TERM GOALS: Time for goal Achievement: 3 weeks  1. Pt can demonstrate initial HEP for self management of impairments.  2. Pt reports at least 50% improvement overall, improved neck AROM and less pain.  3. Pt reports improve quality sleep, less interference from neck pain with ability to sleep 6-8 hours uninterrupted by pain      LONG TERM  GOALS: Time for goal achievement: 6 weeks  1. NDI score <19% by DC indicating improved function.  2. Neck rotation improved to 80 degrees or better for improved safety when driving.  3. Pt reports she  is ready for DC to Independent HEP for self management of her condition and to prevent recurrence   4.Pt will demonstrate (B) mid and lower trap strength = 4/5 for improved postural support and decreased spinal stress    Assessment/Plan  -Manual techniques performed to promote improved tissue mobility and motion   - Pt denied pain at conclusion of session except when turning head to right.  Pt rated pain as 4-5/10 when turning head in this direction   - Added prone chin tuck with retraction to existing HEP.  Handout provided.   Progress per Plan of Care            Timed:         Manual Therapy:    25     mins  09810;     Therapeutic Exercise:    10     mins  98854;     Neuromuscular Keyla:    0    mins  72480;    Therapeutic Activity:     0     mins  55284;     Gait Trainin     mins  00267;     Ultrasound:     0     mins  64531;    Ionto                               0    mins   00890  Self  Care                       0     mins   85347  Canalith Repos               0    mins  4209  Aquatic                          0     mins 37059    Un-Timed:  Electrical Stimulation:    0     mins  53832 ( );  Dry Needling     0     mins self-pay  Traction     0     mins 52755  Low Eval     0     Mins  34228  Mod Eval     0     Mins  33010  High Eval                       0     Mins  77300  Re-Eval                           0    mins  29163    Timed Treatment:   35   mins   Total Treatment:     35   mins    Mey Mccauley, PT PT, DPT, [id number]

## 2023-09-05 ENCOUNTER — TREATMENT (OUTPATIENT)
Dept: PHYSICAL THERAPY | Facility: CLINIC | Age: 23
End: 2023-09-05
Payer: COMMERCIAL

## 2023-09-05 DIAGNOSIS — M54.6 CHRONIC BILATERAL THORACIC BACK PAIN: ICD-10-CM

## 2023-09-05 DIAGNOSIS — G89.29 CHRONIC BILATERAL THORACIC BACK PAIN: ICD-10-CM

## 2023-09-05 DIAGNOSIS — M54.2 PAIN, NECK: Primary | ICD-10-CM

## 2023-09-05 DIAGNOSIS — E53.8 LOW SERUM VITAMIN B12: ICD-10-CM

## 2023-09-05 DIAGNOSIS — Z00.00 HEALTHCARE MAINTENANCE: Primary | ICD-10-CM

## 2023-09-05 NOTE — PROGRESS NOTES
Physical Therapy Treatment  Note  [address] [place]   [address]     Diagnosis Plan   1. Pain, neck        2. Chronic bilateral thoracic back pain            VISIT#: 5    Sunita Chaney reports: feeling pretty good today.  Patient reported she noted pain when working on the computer and sitting looking at her phone.  Patient reported when she focuses on her posture and performs activities, pain levels improve  Objective     See Exercise, Manual, and Modality Logs for complete treatment.     Goals  Plan Goals: SHORT TERM GOALS: Time for goal Achievement: 3 weeks  1. Pt can demonstrate initial HEP for self management of impairments.  2. Pt reports at least 50% improvement overall, improved neck AROM and less pain.  3. Pt reports improve quality sleep, less interference from neck pain with ability to sleep 6-8 hours uninterrupted by pain      LONG TERM  GOALS: Time for goal achievement: 6 weeks  1. NDI score <19% by DC indicating improved function.  2. Neck rotation improved to 80 degrees or better for improved safety when driving.  3. Pt reports she  is ready for DC to Independent HEP for self management of her condition and to prevent recurrence   4.Pt will demonstrate (B) mid and lower trap strength = 4/5 for improved postural support and decreased spinal stress    Assessment/Plan  -Manual techniques performed to promote improved tissue mobility and motion   - Pt reported improved pain levels with motion.  Patient reported no pain at end of session.  1-2/10 with neck rotation or sidebending which is improved from previous values.   Progress per Plan of Care            Timed:         Manual Therapy:    20     mins  12722;     Therapeutic Exercise:    23     mins  89877;     Neuromuscular Keyla:    0    mins  90269;    Therapeutic Activity:     0     mins  42090;     Gait Trainin     mins  52377;     Ultrasound:     0     mins  59402;    Ionto                               0    mins    18515  Self Care                       0     mins   36935  Canalith Repos               0    mins  4209  Aquatic                          0     mins 99889    Un-Timed:  Electrical Stimulation:    0     mins  04941 ( );  Dry Needling     0     mins self-pay  Traction     0     mins 32997  Low Eval     0     Mins  92445  Mod Eval     0     Mins  85354  High Eval                       0     Mins  65275  Re-Eval                           0    mins  57471    Timed Treatment:   43   mins   Total Treatment:     43   mins    Mey Mccauley, PT PT, DPT, [id number]

## 2023-09-11 ENCOUNTER — TREATMENT (OUTPATIENT)
Dept: PHYSICAL THERAPY | Facility: CLINIC | Age: 23
End: 2023-09-11
Payer: COMMERCIAL

## 2023-09-11 DIAGNOSIS — M54.2 PAIN, NECK: Primary | ICD-10-CM

## 2023-09-11 DIAGNOSIS — M54.6 CHRONIC BILATERAL THORACIC BACK PAIN: ICD-10-CM

## 2023-09-11 DIAGNOSIS — G89.29 CHRONIC BILATERAL THORACIC BACK PAIN: ICD-10-CM

## 2023-09-11 NOTE — PROGRESS NOTES
Physical Therapy Treatment  Note  [address] [place]   [address]     Diagnosis Plan   1. Pain, neck        2. Chronic bilateral thoracic back pain            VISIT#: 6    Sunita Chaney reports: feeling pretty good today.  Patient reported she is becoming more self aware of positioning and posture and what positions aggravate or improve symptoms.   Objective     See Exercise, Manual, and Modality Logs for complete treatment.     Goals  Plan Goals: SHORT TERM GOALS: Time for goal Achievement: 3 weeks  1. Pt can demonstrate initial HEP for self management of impairments.  2. Pt reports at least 50% improvement overall, improved neck AROM and less pain.  3. Pt reports improve quality sleep, less interference from neck pain with ability to sleep 6-8 hours uninterrupted by pain      LONG TERM  GOALS: Time for goal achievement: 6 weeks  1. NDI score <19% by DC indicating improved function.  2. Neck rotation improved to 80 degrees or better for improved safety when driving.  3. Pt reports she  is ready for DC to Independent HEP for self management of her condition and to prevent recurrence   4.Pt will demonstrate (B) mid and lower trap strength = 4/5 for improved postural support and decreased spinal stress    Assessment/Plan  -Manual techniques performed to promote improved tissue mobility and motion   - Pt reported increased discomfort when turning head toward left and looking down.  Therapist encouraged performing (L) levator stretch regularly.  Reviewed technique.  Patient verbalized understanding.   Progress per Plan of Care            Timed:         Manual Therapy:    15    mins  52680;     Therapeutic Exercise:    15     mins  20148;     Neuromuscular Keyla:    0    mins  56752;    Therapeutic Activity:     0     mins  13387;     Gait Trainin     mins  62035;     Ultrasound:     0     mins  72647;    Ionto                               0    mins   42547  Self Care                        0     mins   85344  Canalith Repos               0    mins  4209  Aquatic                          0     mins 10115    Un-Timed:  Electrical Stimulation:    0     mins  39729 ( );  Dry Needling     0     mins self-pay  Traction     0     mins 48484  Low Eval     0     Mins  65797  Mod Eval     0     Mins  33818  High Eval                       0     Mins  46779  Re-Eval                           0    mins  57200    Timed Treatment:   30   mins   Total Treatment:     30   mins    Mey Mccauley, PT PT, DPT, [id number]

## 2023-09-13 ENCOUNTER — OFFICE VISIT (OUTPATIENT)
Dept: INTERNAL MEDICINE | Facility: CLINIC | Age: 23
End: 2023-09-13
Payer: COMMERCIAL

## 2023-09-13 VITALS
WEIGHT: 143.3 LBS | DIASTOLIC BLOOD PRESSURE: 60 MMHG | HEART RATE: 95 BPM | HEIGHT: 67 IN | BODY MASS INDEX: 22.49 KG/M2 | OXYGEN SATURATION: 98 % | SYSTOLIC BLOOD PRESSURE: 108 MMHG | TEMPERATURE: 98.2 F

## 2023-09-13 DIAGNOSIS — F33.1 MODERATE EPISODE OF RECURRENT MAJOR DEPRESSIVE DISORDER: ICD-10-CM

## 2023-09-13 DIAGNOSIS — F41.0 GENERALIZED ANXIETY DISORDER WITH PANIC ATTACKS: ICD-10-CM

## 2023-09-13 DIAGNOSIS — F41.1 GENERALIZED ANXIETY DISORDER WITH PANIC ATTACKS: ICD-10-CM

## 2023-09-13 DIAGNOSIS — J30.2 SEASONAL ALLERGIC RHINITIS, UNSPECIFIED TRIGGER: ICD-10-CM

## 2023-09-13 DIAGNOSIS — Z78.9 ELECTRONIC CIGARETTE USE: ICD-10-CM

## 2023-09-13 DIAGNOSIS — Z00.00 HEALTHCARE MAINTENANCE: Primary | ICD-10-CM

## 2023-09-13 DIAGNOSIS — E53.8 LOW SERUM VITAMIN B12: ICD-10-CM

## 2023-09-13 DIAGNOSIS — F90.0 ADHD, PREDOMINANTLY INATTENTIVE TYPE: ICD-10-CM

## 2023-09-13 DIAGNOSIS — J34.2 DEVIATED NASAL SEPTUM: ICD-10-CM

## 2023-09-13 LAB
ALBUMIN SERPL-MCNC: 4.5 G/DL (ref 3.5–5.2)
ALBUMIN/GLOB SERPL: 1.9 G/DL
ALP SERPL-CCNC: 61 U/L (ref 39–117)
ALT SERPL-CCNC: 15 U/L (ref 1–33)
AST SERPL-CCNC: 10 U/L (ref 1–32)
BASOPHILS # BLD AUTO: 0.04 10*3/MM3 (ref 0–0.2)
BASOPHILS NFR BLD AUTO: 0.8 % (ref 0–1.5)
BILIRUB SERPL-MCNC: 0.7 MG/DL (ref 0–1.2)
BUN SERPL-MCNC: 10 MG/DL (ref 6–20)
BUN/CREAT SERPL: 14.9 (ref 7–25)
CALCIUM SERPL-MCNC: 9.4 MG/DL (ref 8.6–10.5)
CHLORIDE SERPL-SCNC: 108 MMOL/L (ref 98–107)
CHOLEST SERPL-MCNC: 204 MG/DL (ref 0–200)
CO2 SERPL-SCNC: 21 MMOL/L (ref 22–29)
CREAT SERPL-MCNC: 0.67 MG/DL (ref 0.57–1)
EGFRCR SERPLBLD CKD-EPI 2021: 125.3 ML/MIN/1.73
EOSINOPHIL # BLD AUTO: 0.15 10*3/MM3 (ref 0–0.4)
EOSINOPHIL NFR BLD AUTO: 2.8 % (ref 0.3–6.2)
ERYTHROCYTE [DISTWIDTH] IN BLOOD BY AUTOMATED COUNT: 11.6 % (ref 12.3–15.4)
FOLATE SERPL-MCNC: 12.8 NG/ML (ref 4.78–24.2)
GLOBULIN SER CALC-MCNC: 2.4 GM/DL
GLUCOSE SERPL-MCNC: 90 MG/DL (ref 65–99)
HCT VFR BLD AUTO: 41 % (ref 34–46.6)
HDLC SERPL-MCNC: 54 MG/DL (ref 40–60)
HGB BLD-MCNC: 13.8 G/DL (ref 12–15.9)
IMM GRANULOCYTES # BLD AUTO: 0.01 10*3/MM3 (ref 0–0.05)
IMM GRANULOCYTES NFR BLD AUTO: 0.2 % (ref 0–0.5)
LDLC SERPL CALC-MCNC: 132 MG/DL (ref 0–100)
LDLC/HDLC SERPL: 2.41 {RATIO}
LYMPHOCYTES # BLD AUTO: 1.27 10*3/MM3 (ref 0.7–3.1)
LYMPHOCYTES NFR BLD AUTO: 24 % (ref 19.6–45.3)
MCH RBC QN AUTO: 29.8 PG (ref 26.6–33)
MCHC RBC AUTO-ENTMCNC: 33.7 G/DL (ref 31.5–35.7)
MCV RBC AUTO: 88.6 FL (ref 79–97)
MONOCYTES # BLD AUTO: 0.29 10*3/MM3 (ref 0.1–0.9)
MONOCYTES NFR BLD AUTO: 5.5 % (ref 5–12)
NEUTROPHILS # BLD AUTO: 3.54 10*3/MM3 (ref 1.7–7)
NEUTROPHILS NFR BLD AUTO: 66.7 % (ref 42.7–76)
NRBC BLD AUTO-RTO: 0 /100 WBC (ref 0–0.2)
PLATELET # BLD AUTO: 319 10*3/MM3 (ref 140–450)
POTASSIUM SERPL-SCNC: 4.3 MMOL/L (ref 3.5–5.2)
PROT SERPL-MCNC: 6.9 G/DL (ref 6–8.5)
RBC # BLD AUTO: 4.63 10*6/MM3 (ref 3.77–5.28)
SODIUM SERPL-SCNC: 138 MMOL/L (ref 136–145)
TRIGL SERPL-MCNC: 100 MG/DL (ref 0–150)
TSH SERPL DL<=0.005 MIU/L-ACNC: 1.85 UIU/ML (ref 0.27–4.2)
VIT B12 SERPL-MCNC: 450 PG/ML (ref 211–946)
VLDLC SERPL CALC-MCNC: 18 MG/DL (ref 5–40)
WBC # BLD AUTO: 5.3 10*3/MM3 (ref 3.4–10.8)

## 2023-09-13 RX ORDER — FLUCONAZOLE 150 MG/1
TABLET ORAL
COMMUNITY
Start: 2023-07-26 | End: 2023-09-13

## 2023-09-13 NOTE — PROGRESS NOTES
Subjective   Socorro Chaney is a 23 y.o. female.     Chief Complaint   Patient presents with    Annual Exam        History of Present Illness   She is here today for CPE.  She is fasting today for lab work. She is feeling well today. She is exercising outdoors frequently. She tries to eat a healthy diet, cooking at home. She has traveled to [place], [place] and [place] this Summer.     CLAIRE/MDD/ADHD- she is feeling that anxiety and depression are well controlled now. She did notes mood changes earlier this year, but thought this was related to her nexplanon. She was seen by GYN and implant replaced with improvement in mood. She has been noticing more difficulty focusing at work. She notes afternoon fatigue and difficulty completing tasks. This is affecting her every day life. She has never been on medication for this.     Seasonal AR- she has been experiencing a lot of postnasal drainage and congestion along with aural fullness. She is not currently taking any medications for allergies. She notes hx of late summer and fall allergies.  She also notes an increased difficulty in breathing out of her left nostril.  She has a history of nasal trauma without repair.  She is now having to breathe through her mouth often.  She has never seen ENT.  She was recently treated for bilateral otitis externa with Ciprodex twice daily with improvement in symptoms.    Electronic cigarette use- she is trying to quit smoking.     GYN- she is UTD with GYN, last pap completed May 2023. She has Nexplanon in place.  She is currently sexually active, monogamous with her male partner.    The following portions of the patient's history were reviewed and updated as appropriate: allergies, current medications, past family history, past medical history, past social history, past surgical history, and problem list.    Review of Systems   Constitutional:  Negative for appetite change, chills, diaphoresis, fatigue, fever, unexpected weight gain  and unexpected weight loss.   HENT:  Positive for congestion and postnasal drip. Negative for dental problem, ear discharge, ear pain, hearing loss, mouth sores, nosebleeds, rhinorrhea, sinus pressure, sore throat, swollen glands, tinnitus and trouble swallowing.         Difficulty breathing through the nose     Eyes:  Negative for blurred vision, pain, discharge, redness, itching and visual disturbance.   Respiratory:  Negative for cough, chest tightness, shortness of breath and wheezing.    Cardiovascular:  Negative for chest pain, palpitations and leg swelling.   Gastrointestinal:  Negative for abdominal distention, abdominal pain, blood in stool, constipation, diarrhea, nausea, vomiting and GERD.   Endocrine: Negative for cold intolerance and heat intolerance.   Genitourinary:  Negative for breast discharge, breast lump, breast pain, difficulty urinating, dysuria, flank pain, frequency, hematuria, menstrual problem, pelvic pain, pelvic pressure, urgency, urinary incontinence, vaginal bleeding and vaginal discharge.   Musculoskeletal:  Negative for arthralgias, back pain, gait problem, joint swelling, myalgias and neck pain.   Skin:  Negative for color change, rash and skin lesions.   Allergic/Immunologic: Negative for environmental allergies and food allergies.   Neurological:  Negative for dizziness, tremors, seizures, syncope, speech difficulty, weakness, light-headedness, numbness, headache, memory problem and confusion.   Hematological:  Negative for adenopathy. Does not bruise/bleed easily.   Psychiatric/Behavioral:  Positive for behavioral problems and decreased concentration. Negative for sleep disturbance, suicidal ideas, depressed mood and stress. The patient is not nervous/anxious.      Objective   Physical Exam  Constitutional:       Appearance: Normal appearance. She is well-developed.   HENT:      Head: Normocephalic and atraumatic.      Right Ear: Hearing, tympanic membrane and external ear normal.       Left Ear: Hearing, tympanic membrane and external ear normal.      Ears:      Comments: Dry flaking present bilateral canal.     Nose: Nose normal. Septal deviation present.      Left Turbinates: Enlarged.      Right Sinus: No maxillary sinus tenderness or frontal sinus tenderness.      Left Sinus: No maxillary sinus tenderness or frontal sinus tenderness.      Mouth/Throat:      Lips: Pink.      Mouth: Mucous membranes are moist.      Dentition: Normal dentition.      Tongue: No lesions.      Pharynx: Oropharynx is clear. Uvula midline.      Tonsils: No tonsillar exudate.   Eyes:      General: Lids are normal.      Extraocular Movements: Extraocular movements intact.      Conjunctiva/sclera: Conjunctivae normal.      Pupils: Pupils are equal, round, and reactive to light.   Neck:      Thyroid: No thyroid mass, thyromegaly or thyroid tenderness.      Vascular: No carotid bruit.      Trachea: Trachea normal.   Cardiovascular:      Rate and Rhythm: Normal rate and regular rhythm.      Pulses: Normal pulses.           Radial pulses are 2+ on the right side and 2+ on the left side.        Popliteal pulses are 2+ on the right side and 2+ on the left side.        Dorsalis pedis pulses are 2+ on the right side and 2+ on the left side.        Posterior tibial pulses are 2+ on the right side and 2+ on the left side.      Heart sounds: S1 normal and S2 normal.   Pulmonary:      Effort: Pulmonary effort is normal.      Breath sounds: Normal breath sounds.   Abdominal:      General: Bowel sounds are normal. There is no distension or abdominal bruit.      Palpations: Abdomen is soft. There is no hepatomegaly or splenomegaly.      Tenderness: There is no abdominal tenderness.      Hernia: No hernia is present.   Musculoskeletal:      Cervical back: Normal range of motion and neck supple.      Right lower leg: No edema.      Left lower leg: No edema.   Lymphadenopathy:      Head:      Right side of head: No submental,  submandibular, tonsillar or occipital adenopathy.      Left side of head: No submental, submandibular, tonsillar or occipital adenopathy.      Cervical: No cervical adenopathy.      Upper Body:      Right upper body: No supraclavicular adenopathy.      Left upper body: No supraclavicular adenopathy.      Lower Body: No right inguinal adenopathy. No left inguinal adenopathy.   Skin:     General: Skin is warm and dry.      Findings: No rash.      Nails: There is no clubbing.   Neurological:      General: No focal deficit present.      Mental Status: She is alert and oriented to person, place, and time.      Cranial Nerves: Cranial nerves 2-12 are intact.      Sensory: Sensation is intact.      Motor: Motor function is intact.      Coordination: Coordination is intact.      Gait: Gait is intact.      Deep Tendon Reflexes:      Reflex Scores:       Patellar reflexes are 2+ on the right side and 2+ on the left side.  Psychiatric:         Attention and Perception: Attention and perception normal.         Mood and Affect: Mood and affect normal.         Speech: Speech normal.         Behavior: Behavior normal. Behavior is cooperative.         Thought Content: Thought content normal.         Cognition and Memory: Cognition and memory normal.         Judgment: Judgment normal.       Vitals:    09/13/23 0904   BP: 108/60   Pulse: 95   Temp: 98.2 °F (36.8 °C)   SpO2: 98%      Body mass index is 22.44 kg/m².    Assessment & Plan   Problems Addressed this Visit          Allergies and Adverse Reactions    Seasonal allergic rhinitis    Relevant Orders    CBC & Differential       Mental Health    Generalized anxiety disorder with panic attacks    Relevant Orders    CBC & Differential    Comprehensive Metabolic Panel    TSH Rfx On Abnormal To Free T4    Ambulatory Referral to Behavioral Health    Moderate episode of recurrent major depressive disorder    Relevant Orders    CBC & Differential    Comprehensive Metabolic Panel    TSH  Rfx On Abnormal To Free T4    Ambulatory Referral to Behavioral Health    ADHD, predominantly inattentive type    Relevant Orders    Ambulatory Referral to Behavioral Health       Tobacco    Electronic cigarette use    Relevant Orders    CBC & Differential    Comprehensive Metabolic Panel    Lipid Panel With LDL / HDL Ratio     Other Visit Diagnoses       Healthcare maintenance    -  Primary    Relevant Orders    CBC & Differential    Comprehensive Metabolic Panel    Lipid Panel With LDL / HDL Ratio    TSH Rfx On Abnormal To Free T4    Vitamin B12    Folate    Deviated nasal septum        Relevant Orders    Ambulatory Referral to ENT (Otolaryngology)    Low serum vitamin B12        Relevant Orders    CBC & Differential    Vitamin B12    Folate          Diagnoses         Codes Comments    Healthcare maintenance    -  Primary ICD-10-CM: Z00.00  ICD-9-CM: V70.0     Electronic cigarette use     ICD-10-CM: Z78.9  ICD-9-CM: 305.1     Moderate episode of recurrent major depressive disorder     ICD-10-CM: F33.1  ICD-9-CM: 296.32     Generalized anxiety disorder with panic attacks     ICD-10-CM: F41.1, F41.0  ICD-9-CM: 300.02, 300.01     ADHD, predominantly inattentive type     ICD-10-CM: F90.0  ICD-9-CM: 314.00     Seasonal allergic rhinitis, unspecified trigger     ICD-10-CM: J30.2  ICD-9-CM: 477.9     Deviated nasal septum     ICD-10-CM: J34.2  ICD-9-CM: 470     Low serum vitamin B12     ICD-10-CM: E53.8  ICD-9-CM: 266.2           1.  Preventative counseling-encouraged her to continue healthy eating and regular exercise.  Ensure adequate dietary intake of calcium and vitamin D.  2.  MDD/CLAIRE-well-controlled off medication.  Encouraged her to continue to stay active and eat a healthy diet.  Notify for worsening symptoms.  3.  ADHD-symptoms are now affecting everyday life.  Referral placed to behavioral health for further evaluation and management.  4.  Deviated septum/seasonal AR-referral placed to ENT for further  evaluation.  Recommend restarting nasal steroid spray and antihistamine daily.  5.  Electronic cigarette use-encouraged her to quit.  She is working on this.  6.  Low serum B12-check labs today.  “Discussed risks/benefits to vaccination, reviewed components of the vaccine, discussed VIS, discussed informed consent, informed consent obtained. Patient/Parent was allowed to accept or refuse vaccine. Questions answered to satisfactory state of patient/Parent. We reviewed typical age appropriate and seasonally appropriate vaccinations. Reviewed immunization history and updated state vaccination form as needed. Patient was counseled on HPV  Tdap    Dentist up-to-date  Eye exam up-to-date  GYN up-to-date  Wears sunscreen outside    Fasting labs today, will call with results.  Follow-up in 1 year for CPE with fasting labs prior or sooner as needed.

## 2023-09-18 ENCOUNTER — TREATMENT (OUTPATIENT)
Dept: PHYSICAL THERAPY | Facility: CLINIC | Age: 23
End: 2023-09-18
Payer: COMMERCIAL

## 2023-09-18 DIAGNOSIS — G89.29 CHRONIC BILATERAL THORACIC BACK PAIN: ICD-10-CM

## 2023-09-18 DIAGNOSIS — M54.2 PAIN, NECK: Primary | ICD-10-CM

## 2023-09-18 DIAGNOSIS — M54.6 CHRONIC BILATERAL THORACIC BACK PAIN: ICD-10-CM

## 2023-09-18 NOTE — PROGRESS NOTES
Physical Therapy  Discharge  [address], [place] [address], Phone: [phone]    Patient: Socorro Chaney   : 2000  Diagnosis/ICD-10 Code:  Pain, neck [M54.2]  Referring practitioner: ROBERT Ivan  Date of Initial Visit: Type: THERAPY  Noted: 2023  Today's Date: 2023  Patient seen for 7 sessions      Subjective:   Visit Diagnosis:    ICD-10-CM ICD-9-CM   1. Pain, neck  M54.2 723.1   2. Chronic bilateral thoracic back pain  M54.6 724.1    G89.29 338.29       Socorro Chaney reports: no pain at rest  Subjective Questionnaire: NDI: indicates 14% functional impairment (at initial evaluation: NDI: indicates 26% functional impairment  )  Clinical Progress: improved  Home Program Compliance: Yes  Treatment has included: therapeutic exercise and manual therapy    Subjective   Pain  Current pain ratin  At best pain ratin  At worst pain rating: 3 (highest level at initial evaluation: 6 )   Location; (L) levator, medial border of (L) scapula    Objective   Left rotation: 75 degrees - with pain along (L) levator scap  Right rotation: 65 degrees     (B) lower and middle trap grossly 3- to 3/5- no significant change since initial evaluation     Assessment/Plan  - Pt in agreement to D/C from therapy today.  Patient stated she has all printed HEP handouts  - See goals section for details.   - Therapist encouraged pt to perform HEP regularly.     Progress toward previous goals: Partially Met      Goals  Plan Goals: SHORT TERM GOALS: Time for goal Achievement: 3 weeks  1. Pt can demonstrate initial HEP for self management of impairments: MET  2. Pt reports at least 50% improvement overall, improved neck AROM and less pain. MET, highest level of pain improved from 6/10 to 3/10   3. Pt reports improve quality sleep, less interference from neck pain with ability to sleep 6-8 hours uninterrupted by pain :MET     LONG TERM  GOALS: Time for goal achievement: 6 weeks  1. NDI score  <19% by DC indicating improved function. MET, scoring improved from 26% impairment to 14% impairment  2. Neck rotation improved to 80 degrees or better for improved safety when driving.PROGRESSING, improved AROM as measured via goniometer  3. Pt reports she  is ready for DC to [facility] for self management of her condition and to prevent recurrence MET  4.Pt will demonstrate (B) mid and lower trap strength = 4/5 for improved postural support and decreased spinal stress: NO CHANGE, (B) lower and middle trap grossly 3- to 3/5- no significant change since initial evaluation   Short-term goals (STG):   3/3  Long-term goals (LTG):    2      Recommendations: Discharge      Timed:         Manual Therapy:    15     mins  19791;     Therapeutic Exercise:    15     mins  98876;     Neuromuscular Keyla:    0    mins  73897;    Therapeutic Activity:     0     mins  67740;     Gait Trainin     mins  12974;     Ultrasound:     0     mins  30788;    Ionto                               0    mins   36640  Self Care                       0     mins   84104  Aquatic                          0     mins 00883      Un-Timed:  Electrical Stimulation:    0     mins  97522 ( );  Dry Needling     0     mins self-pay  Traction     0     mins 02460  Low Eval     0     Mins  93893  Mod Eval     0     Mins  86545  High Eval                       0     Mins  40867  Re-Eval                           0    mins  45149      Timed Treatment:   30   mins   Total Treatment:     30   mins      PT Signature: Mey Mccauley, PT  [place] License #: [id number]

## 2023-09-18 NOTE — LETTER
Progress Note  2023  Parvin Cheung APRN    Re: Socorro Chaney  ________________________________________________________________  Physical Therapy  Discharge  [address], [place] [address], Phone: [phone]    Patient: Socorro Chaney   : 2000  Diagnosis/ICD-10 Code:  Pain, neck [M54.2]  Referring practitioner: ROBERT Ivan  Date of Initial Visit: Type: THERAPY  Noted: 2023  Today's Date: 2023  Patient seen for 7 sessions      Subjective:   Visit Diagnosis:    ICD-10-CM ICD-9-CM   1. Pain, neck  M54.2 723.1   2. Chronic bilateral thoracic back pain  M54.6 724.1    G89.29 338.29       Socorro Chaney reports: no pain at rest  Subjective Questionnaire: NDI: indicates 14% functional impairment (at initial evaluation: NDI: indicates 26% functional impairment  )  Clinical Progress: improved  Home Program Compliance: Yes  Treatment has included: therapeutic exercise and manual therapy    Subjective   Pain  Current pain ratin  At best pain ratin  At worst pain rating: 3 (highest level at initial evaluation: 6 )   Location; (L) levator, medial border of (L) scapula    Objective   Left rotation: 75 degrees - with pain along (L) levator scap  Right rotation: 65 degrees     (B) lower and middle trap grossly 3- to 3/5- no significant change since initial evaluation     Assessment/Plan  - Pt in agreement to D/C from therapy today.  Patient stated she has all printed HEP handouts  - See goals section for details.   - Therapist encouraged pt to perform HEP regularly.     Progress toward previous goals: Partially Met      Goals  Plan Goals: SHORT TERM GOALS: Time for goal Achievement: 3 weeks  1. Pt can demonstrate initial HEP for self management of impairments: MET  2. Pt reports at least 50% improvement overall, improved neck AROM and less pain. MET, highest level of pain improved from 6/10 to 3/10   3. Pt reports improve quality sleep, less interference from neck  pain with ability to sleep 6-8 hours uninterrupted by pain :MET     LONG TERM  GOALS: Time for goal achievement: 6 weeks  1. NDI score <19% by DC indicating improved function. MET, scoring improved from 26% impairment to 14% impairment  2. Neck rotation improved to 80 degrees or better for improved safety when driving.PROGRESSING, improved AROM as measured via goniometer  3. Pt reports she  is ready for DC to [facility] for self management of her condition and to prevent recurrence MET  4.Pt will demonstrate (B) mid and lower trap strength = 4/5 for improved postural support and decreased spinal stress: NO CHANGE, (B) lower and middle trap grossly 3- to 3/5- no significant change since initial evaluation   Short-term goals (STG):   3/3  Long-term goals (LTG):    2      Recommendations: Discharge      Timed:         Manual Therapy:    15     mins  25728;     Therapeutic Exercise:    15     mins  27035;     Neuromuscular Keyla:    0    mins  80115;    Therapeutic Activity:     0     mins  85754;     Gait Trainin     mins  85046;     Ultrasound:     0     mins  64784;    Ionto                               0    mins   05100  Self Care                       0     mins   33709  Aquatic                          0     mins 10778      Un-Timed:  Electrical Stimulation:    0     mins  02703 ( );  Dry Needling     0     mins self-pay  Traction     0     mins 67202  Low Eval     0     Mins  03555  Mod Eval     0     Mins  73098  High Eval                       0     Mins  10413  Re-Eval                           0    mins  72143      Timed Treatment:   30   mins   Total Treatment:     30   mins      PT Signature: Mey Mccauley PT  [place] License #: [id number]   Thank you for this referral.

## 2024-09-10 DIAGNOSIS — F33.1 MODERATE EPISODE OF RECURRENT MAJOR DEPRESSIVE DISORDER: ICD-10-CM

## 2024-09-10 DIAGNOSIS — E53.8 LOW SERUM VITAMIN B12: ICD-10-CM

## 2024-09-10 DIAGNOSIS — F41.1 GENERALIZED ANXIETY DISORDER WITH PANIC ATTACKS: ICD-10-CM

## 2024-09-10 DIAGNOSIS — F41.0 GENERALIZED ANXIETY DISORDER WITH PANIC ATTACKS: ICD-10-CM

## 2024-09-10 DIAGNOSIS — Z00.00 HEALTHCARE MAINTENANCE: Primary | ICD-10-CM

## 2024-09-10 DIAGNOSIS — Z78.9 ELECTRONIC CIGARETTE USE: ICD-10-CM

## 2024-11-06 ENCOUNTER — OFFICE VISIT (OUTPATIENT)
Dept: INTERNAL MEDICINE | Facility: CLINIC | Age: 24
End: 2024-11-06
Payer: COMMERCIAL

## 2024-11-06 ENCOUNTER — HOSPITAL ENCOUNTER (OUTPATIENT)
Dept: GENERAL RADIOLOGY | Facility: HOSPITAL | Age: 24
Discharge: HOME | End: 2024-11-06
Admitting: NURSE PRACTITIONER
Payer: COMMERCIAL

## 2024-11-06 VITALS
BODY MASS INDEX: 22.34 KG/M2 | DIASTOLIC BLOOD PRESSURE: 62 MMHG | HEIGHT: 67 IN | HEART RATE: 86 BPM | TEMPERATURE: 98 F | SYSTOLIC BLOOD PRESSURE: 104 MMHG | WEIGHT: 142.3 LBS | OXYGEN SATURATION: 98 %

## 2024-11-06 DIAGNOSIS — F41.0 GENERALIZED ANXIETY DISORDER WITH PANIC ATTACKS: ICD-10-CM

## 2024-11-06 DIAGNOSIS — K21.9 GASTROESOPHAGEAL REFLUX DISEASE, UNSPECIFIED WHETHER ESOPHAGITIS PRESENT: ICD-10-CM

## 2024-11-06 DIAGNOSIS — G89.29 CHRONIC RIGHT-SIDED LOW BACK PAIN WITHOUT SCIATICA: ICD-10-CM

## 2024-11-06 DIAGNOSIS — E53.8 LOW SERUM VITAMIN B12: ICD-10-CM

## 2024-11-06 DIAGNOSIS — J30.2 SEASONAL ALLERGIC RHINITIS, UNSPECIFIED TRIGGER: ICD-10-CM

## 2024-11-06 DIAGNOSIS — Z00.00 HEALTHCARE MAINTENANCE: Primary | ICD-10-CM

## 2024-11-06 DIAGNOSIS — H60.543 DERMATITIS OF BOTH EAR CANALS: ICD-10-CM

## 2024-11-06 DIAGNOSIS — M54.50 CHRONIC RIGHT-SIDED LOW BACK PAIN WITHOUT SCIATICA: ICD-10-CM

## 2024-11-06 DIAGNOSIS — F41.1 GENERALIZED ANXIETY DISORDER WITH PANIC ATTACKS: ICD-10-CM

## 2024-11-06 DIAGNOSIS — F33.1 MODERATE EPISODE OF RECURRENT MAJOR DEPRESSIVE DISORDER: ICD-10-CM

## 2024-11-06 DIAGNOSIS — F90.0 ADHD, PREDOMINANTLY INATTENTIVE TYPE: ICD-10-CM

## 2024-11-06 DIAGNOSIS — R14.0 POSTPRANDIAL ABDOMINAL BLOATING: ICD-10-CM

## 2024-11-06 DIAGNOSIS — Z78.9 ELECTRONIC CIGARETTE USE: ICD-10-CM

## 2024-11-06 PROCEDURE — 72110 X-RAY EXAM L-2 SPINE 4/>VWS: CPT

## 2024-11-06 RX ORDER — OMEPRAZOLE 40 MG/1
40 CAPSULE, DELAYED RELEASE ORAL DAILY
Qty: 30 CAPSULE | Refills: 1 | Status: SHIPPED | OUTPATIENT
Start: 2024-11-06

## 2024-11-06 RX ORDER — FLUOCINOLONE ACETONIDE 0.11 MG/ML
OIL AURICULAR (OTIC) 2 TIMES DAILY
Qty: 20 ML | Refills: 0 | Status: SHIPPED | OUTPATIENT
Start: 2024-11-06

## 2024-11-06 NOTE — PROGRESS NOTES
Subjective   Socorro Chaney is a 24 y.o. female.     Chief Complaint   Patient presents with    Annual Exam        History of Present Illness   She is here today for CPE.  She is due for lab work. She is not fasting today. She notes over the past 2-3 years. She notes abdominal bloating after eating and lower abdominal cramping. She notes occasional pain and tightness in the epigastric area after eating. She has occasional reflux for which she uses Tums. She notes frequent constipation, improves with a stool softener. She has been working on healthier eating, avoiding gluten with some improvement in GI symptoms.   She also notes chronic right-sided low back pain.  She has a history of spondylolisthesis of the lumbar region along with pars defect.  She has previously done physical therapy with improvement.  Back pain is on the right side of the low back occasionally radiating up into the middle back.  She denies any paresthesias, lower extremity weakness, saddle anesthesia, bowel or bladder dysfunction or acute trauma.  She would like to discuss reimaging the lumbar spine today.    MDD/CLAIRE/ADHD- she notes improvement in mood, but would like to see a therapist for talk therapy. She has tried to schedule with a therapist, but had a difficult time finding a therapist or behavioral health specialist that takes her insurance. She does note that she has a hard time focusing and staying on task.     Seasonal AR-with history of deviated nasal septum.  She was evaluated by ENT in January who recommended open septorhinoplasty with possible costal cartilage grafting. She did not end up having surgery.  She notes allergy symptoms have been generally well-controlled.  She does note frequent bilateral ear itching.    Acid reflux- she notes daily symptoms of epigastric discomfort, bloating and lower abdominal cramping. She uses Tums with mild improvement in reflux. She denies any dysphagia or odynophagia.     Electronic cigarette use-  she is currently vaping every few days. She would like to work on quitting.     GYN- she is due to see GYN back in May for pap smear and nexplanon replacement.     The following portions of the patient's history were reviewed and updated as appropriate: allergies, current medications, past family history, past medical history, past social history, past surgical history, and problem list.    Review of Systems   Constitutional: Negative.    HENT:          Bilateral ear itching.   Eyes: Negative.    Respiratory: Negative.     Cardiovascular: Negative.    Gastrointestinal:  Positive for abdominal distention, abdominal pain, constipation and GERD. Negative for anal bleeding, blood in stool, diarrhea, nausea, rectal pain and vomiting.   Endocrine: Negative.    Genitourinary: Negative.    Musculoskeletal:  Positive for back pain.   Skin: Negative.    Allergic/Immunologic: Positive for environmental allergies. Negative for food allergies.   Neurological: Negative.    Hematological: Negative.    Psychiatric/Behavioral:  Positive for decreased concentration. Negative for suicidal ideas and depressed mood. The patient is not nervous/anxious.        Objective   Physical Exam  Constitutional:       Appearance: Normal appearance. She is well-developed.   HENT:      Head: Normocephalic and atraumatic.      Right Ear: Hearing, tympanic membrane and ear canal normal.      Left Ear: Hearing, tympanic membrane and ear canal normal.      Ears:      Comments: Dermatitis present bilateral external ears.     Nose: Septal deviation present.      Right Sinus: No maxillary sinus tenderness or frontal sinus tenderness.      Left Sinus: No maxillary sinus tenderness or frontal sinus tenderness.      Mouth/Throat:      Lips: Pink.      Mouth: Mucous membranes are moist.      Dentition: Normal dentition.      Tongue: No lesions.      Pharynx: Oropharynx is clear. Uvula midline.      Tonsils: No tonsillar exudate.   Eyes:      General: Lids are  normal.      Extraocular Movements: Extraocular movements intact.      Conjunctiva/sclera: Conjunctivae normal.      Pupils: Pupils are equal, round, and reactive to light.   Neck:      Thyroid: No thyroid mass, thyromegaly or thyroid tenderness.      Vascular: No carotid bruit.      Trachea: Trachea normal.   Cardiovascular:      Rate and Rhythm: Normal rate and regular rhythm.      Pulses: Normal pulses.           Radial pulses are 2+ on the right side and 2+ on the left side.        Popliteal pulses are 2+ on the right side and 2+ on the left side.        Dorsalis pedis pulses are 2+ on the right side and 2+ on the left side.        Posterior tibial pulses are 2+ on the right side and 2+ on the left side.      Heart sounds: S1 normal and S2 normal.   Pulmonary:      Effort: Pulmonary effort is normal.      Breath sounds: Normal breath sounds.   Abdominal:      General: Bowel sounds are normal. There is no distension or abdominal bruit.      Palpations: Abdomen is soft. There is no hepatomegaly or splenomegaly.      Tenderness: There is abdominal tenderness in the epigastric area.      Hernia: No hernia is present.   Musculoskeletal:      Cervical back: Normal, normal range of motion and neck supple.      Thoracic back: Normal.      Lumbar back: Tenderness present. Negative right straight leg raise test and negative left straight leg raise test.      Right lower leg: No edema.      Left lower leg: No edema.   Lymphadenopathy:      Head:      Right side of head: No submental, submandibular, tonsillar or occipital adenopathy.      Left side of head: No submental, submandibular, tonsillar or occipital adenopathy.      Cervical: No cervical adenopathy.      Upper Body:      Right upper body: No supraclavicular adenopathy.      Left upper body: No supraclavicular adenopathy.      Lower Body: No right inguinal adenopathy. No left inguinal adenopathy.   Skin:     General: Skin is warm and dry.      Findings: No rash.       Nails: There is no clubbing.   Neurological:      General: No focal deficit present.      Mental Status: She is alert and oriented to person, place, and time.      Cranial Nerves: Cranial nerves 2-12 are intact.      Sensory: Sensation is intact.      Motor: Motor function is intact.      Coordination: Coordination is intact.      Gait: Gait is intact.      Deep Tendon Reflexes:      Reflex Scores:       Patellar reflexes are 2+ on the right side and 2+ on the left side.  Psychiatric:         Attention and Perception: Attention and perception normal.         Mood and Affect: Mood and affect normal.         Speech: Speech normal.         Behavior: Behavior normal. Behavior is cooperative.         Thought Content: Thought content normal.         Cognition and Memory: Cognition and memory normal.         Judgment: Judgment normal.         Vitals:    11/06/24 1012   BP: 104/62   Pulse: 86   Temp: 98 °F (36.7 °C)   SpO2: 98%      Body mass index is 22.29 kg/m².    Assessment & Plan   Problems Addressed this Visit       Seasonal allergic rhinitis    Generalized anxiety disorder with panic attacks    Relevant Orders    Ambulatory Referral to Behavioral Health    CBC & Differential    Comprehensive Metabolic Panel    TSH Rfx On Abnormal To Free T4    Moderate episode of recurrent major depressive disorder    Relevant Orders    Ambulatory Referral to Behavioral Health    CBC & Differential    Comprehensive Metabolic Panel    TSH Rfx On Abnormal To Free T4    ADHD, predominantly inattentive type    Relevant Orders    Ambulatory Referral to Behavioral Health    Electronic cigarette use    Acid reflux    Relevant Medications    omeprazole (priLOSEC) 40 MG capsule    Other Relevant Orders    Ambulatory Referral to Gastroenterology    CBC & Differential    Comprehensive Metabolic Panel     Other Visit Diagnoses       Healthcare maintenance    -  Primary    Relevant Orders    CBC & Differential    Comprehensive Metabolic Panel     Lipid Panel With LDL / HDL Ratio    TSH Rfx On Abnormal To Free T4    Vitamin D,25-Hydroxy    Vitamin B12    Folate    Dermatitis of both ear canals        Relevant Medications    fluocinolone acetonide (DERMOTIC) 0.01 % oil otic oil    Postprandial abdominal bloating        Relevant Medications    omeprazole (priLOSEC) 40 MG capsule    Other Relevant Orders    Ambulatory Referral to Gastroenterology    CBC & Differential    Comprehensive Metabolic Panel    Celiac Comprehensive Panel    Chronic right-sided low back pain without sciatica        Relevant Orders    XR Spine Lumbar 4+ View    Low serum vitamin B12        Relevant Orders    CBC & Differential    Vitamin B12    Folate          Diagnoses         Codes Comments    Healthcare maintenance    -  Primary ICD-10-CM: Z00.00  ICD-9-CM: V70.0     Electronic cigarette use     ICD-10-CM: Z78.9  ICD-9-CM: 305.1     ADHD, predominantly inattentive type     ICD-10-CM: F90.0  ICD-9-CM: 314.00     Generalized anxiety disorder with panic attacks     ICD-10-CM: F41.1, F41.0  ICD-9-CM: 300.02, 300.01     Moderate episode of recurrent major depressive disorder     ICD-10-CM: F33.1  ICD-9-CM: 296.32     Gastroesophageal reflux disease, unspecified whether esophagitis present     ICD-10-CM: K21.9  ICD-9-CM: 530.81     Seasonal allergic rhinitis, unspecified trigger     ICD-10-CM: J30.2  ICD-9-CM: 477.9     Dermatitis of both ear canals     ICD-10-CM: H60.543  ICD-9-CM: 380.22     Postprandial abdominal bloating     ICD-10-CM: R14.0  ICD-9-CM: 787.3     Chronic right-sided low back pain without sciatica     ICD-10-CM: M54.50, G89.29  ICD-9-CM: 724.2, 338.29     Low serum vitamin B12     ICD-10-CM: E53.8  ICD-9-CM: 266.2           1.  Preventative counseling-encouraged healthy, balanced eating and regular exercise.  Ensure adequate dietary intake of calcium and vitamin D.  2.  CLAIRE/MDD/ADHD-referral placed to behavioral health.  3.  GERD/postprandial abdominal bloating-start  omeprazole 40 mg daily.  Take 30 to 45 minutes before breakfast.  Discussed reflux precautions and smoking cessation.  Also recommend starting a daily fiber supplement to help with constipation.  Make sure to hydrate well with fluids.  Check lab work tomorrow including celiac panel.  Referral placed to GI for further evaluation.  4.  Electronic cigarette use-encourage smoking cessation.  She is working on this.  5.  Dermatitis of both ear canals/seasonal AR-prescription for DermOtic otic oil provided to patient today to use twice daily for 2 to 3 days as needed.  6.  Chronic right-sided low back pain-check x-ray lumbar spine today.  Recommend referral to physical therapy.  She will notify me where she wants to go.  “Discussed risks/benefits to vaccination, reviewed components of the vaccine, discussed VIS, discussed informed consent, informed consent obtained. Patient/Parent was allowed to accept or refuse vaccine. Questions answered to satisfactory state of patient/Parent. We reviewed typical age appropriate and seasonally appropriate vaccinations. Reviewed immunization history and updated state vaccination form as needed. Patient was counseled on HPV  Influenza  Tdap    Encouraged routine dental and eye exam.  Encourage sunscreen use outside.  GYN-due in May.    Fasting labs this week, will call with results.  Follow-up in 1 year for CPE with fasting labs prior or sooner as needed.

## 2024-11-08 DIAGNOSIS — G89.29 CHRONIC RIGHT-SIDED LOW BACK PAIN WITHOUT SCIATICA: Primary | ICD-10-CM

## 2024-11-08 DIAGNOSIS — M54.50 CHRONIC RIGHT-SIDED LOW BACK PAIN WITHOUT SCIATICA: Primary | ICD-10-CM

## 2024-11-27 NOTE — PROGRESS NOTES
[facility]   [address], [address]  [place] [address]  [phone]  Savita Gregg, MSN, APRN, PMHNP-BC    NAME: Socorro Chaney     : 2000   MRN: [id number]     Patient Care Team:  Parvin Cheung APRN as PCP - General (Nurse Practitioner)    DATE: 2024    -Patient was referred by: ROBERT Ivan   -Psychiatric history packet turned in/reviewed : [x] Yes [] No    Subjective     CHIEF COMPLAINT: depression, anxiety, ADHD     HPI:  Socorro Chaney, a 24 y.o. female patient seen for the first time today for initial evaluation.    Patient states she has been trying 6 months to get into therapy and has not been able to find anywhere that is covered under her insurance. She went to on therapist that she thought was covered, had 2-3 sessions, and then they advised her that she actually would have to pay the full price, so she had to stop seeing that provider. Her insurance is through [employer] and she says the mental health coverage is through [employer]. We looked it up online, and it appears they have changed to [facility]. I advised her that I could place her on our waiting list here for therapy, but also encouraged her to reach out to her insurance and escalate her call to a supervisor. She states she has called several times and gotten lists of providers, and then found out later they weren't covered, so she is getting very discouraged.     She says she does have depression and anxiety, but her primary concern today is symptoms of ADHD. States she was diagnosed with ADHD as a child. She never took medication at that time. She says she has been able to cope with her symptoms without medications, but she feels like the older she gets, the worse her symptoms have become.     Symptoms of ADHD that she is struggling with: she struggles with holding a conversation. States she struggles with remembering what she wanted to say. She notices she is making  more mistakes at work and at home. States she is very fidgety. She picks at the skin on her fingers.     States she has situational anxiety she states. She struggles with needing to clean the house, complete tasks, worries about being on time. Anxiety appears to be heavily related to the ADHD.      She states she is very short tempered and irritable. She feels like it is related to birth control. She has the implant in. She is having it taken out today. She wants to take a break off of birth control for a while. She has had the implant for 10 years.     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 She will be a . 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     She lives with her boyfriend.     Denies any suicidal thoughts currently.   She has had suicidal thoughts in the past, during 2018, 2019. She went away to college at that time and she feels like that worsened mood, and she also reports she was drinking more heavily at that time.     SYMPTOMS:      MOOD: depressed      SLEEP: she wakes a lot at night. Overall, she describes it as poor.      ENERGY: she describes as average.      CONCENTRATION/FOCUS: She describes as extremely poor.      APPETITE: average     PRIOR PSYCH MEDICATIONS:  Citalopram  Wellbutrin   Fluoxetine   [id number]-- she was on several medications for depression. States they made her symptoms worse. One made her have vivid, scary dreams. One made her yawn constantly. Depressive symptoms were also getting worse. Three medications listed above, but she doesn't remember which was which.     PRIOR PSYCH DX:   Depression   Anxiety   ADHD--diagnosed as a teen.     PRIOR MENTAL HEALTH PROVIDERS:  Only ever been with primary care.     PSYCH ADMISSIONS:   Denies    SELF HARM/SUICIDALLY:   Denies     SOCIAL HISTORY:  Relationships: she has a boyfriend.   Occupation: she is going to start being an , but is currently working as a .  "  Living Arrangements: Lives with boyfriend.   Trauma (emotional, psychological, physical, sexual) : no history of trauma.   States childhood was \"rough\". Her father was an alcoholic. Says her mother had bad depression. She had a step mother who she says stepped in and did a lot, but she states she doesn't really have a relationship with her mom currently. States that is some of what she was hoping to sort through in therapy.  Her dad is currently sober, and she states they have a good relationship now.     SUBSTANCE USE:   Nicotine/Tobacco: vapes, nicotine  Alcohol: drinking socially  Illicit drugs: denies   Cannabis/Marijuana: denies   Caffeine: she does drink caffeine     Family Psychiatric History:   Mother--depression, anxiety   Brother--none   Dad--alcoholic.      PREGNANT/BREASTFEEDING:   No    SEIZURE HISTORY: No    PLAN OF CARE:  Discussed different types of ADHD medications, including stimulants and non-stimulants. Advised of risks and benefits of both, and controlled substance policy with stimulants. Patient would like to try a stimulant and was agreeable to the controlled substance policy. Advised we would start a medication for ADHD, and see how her mood did. May need to add medication for mood/anxiety in the future. She was agreeable to the plan of care.     Diagnostic Screening Tools:  Patient completed an ASRS screening on 12/2/24 that was indicative of a combined type of ADHD.     Wender [place] Rating Scale for ADHD--25 Questions Associated with ADHD     As a child, I was:   3 Concentration problems, easily distracted  Very Much  4 Anxious, worrying 3-Quite a Bit  5 Nervous, fidgety 2-Moderately   6 Inattentive, daydreaming  Very Much  7 Hot- or short-tempered, low boiling point  Very Much  9 Temper outbursts, tantrums 3-Quite a Bit  10 Trouble with stick-to-it-tiveness, not following through. Failing to finish things started  Very Much  11 Stubborn, strong-willed  Very Much  12 Sad or blue, " "depressed, unhappy 1- MIldly  15 Disobedient with parents, rebellious, sassy  Very Much  16 Low opinion of myself 1- MIldly  17 Irritable  Very Much  20 Ferrer, ups and downs  Very Much  21 Angry  Very Much  24 Acting without thinking, impulsive 1- MIldly  25 Tendency to be immature 1- MIldly  26 Guilty feelings, regretful 1- MIldly  27 Losing control of myself 0- Not at all or very slightly   28 Tendency to be or act irrational 2-Moderately   29 Unpopular with other children, didn't keep friends for long, didn't get along with other children 0- Not at all or very slightly   40 Trouble seeing things from someone else's point of view 2-Moderately   41 Trouble with authorities, trouble with school,visits to principal's office 1- MIldly    As a child in school I was (or had):  51 Overall a poor student, slow learner  Very Much  56 Trouble with mathematics or numbers 3-Quite a Bit  59 Not achieving up to potential  Very Much    WURS-25: A score of >/= 46 is  considered elevated and possibly predictive of ADHD.     Patient Score on Wender [place]: 65, indicative of diagnosis of ADHD.     Patient presents with symptoms and behaviors that are consistent with the following DSM-5 diagnoses:  ADHD, combined   2. Generalized anxiety disorder  3. Major depressive disorder     Ability and capacity to respond to treatment: good  Functional status: fair  Prognosis: good  Long term goals:  improve focus and concentration, improve anxiety, improve depression.   Short term goals:improve focus and concentration, improve anxiety, improve depression.     Objective     /70   Pulse 68   Ht 170.2 cm (67\")   Wt 67.1 kg (148 lb)   SpO2 98%   BMI 23.18 kg/m²   No LMP recorded. Patient has had an implant.    Social History     Occupational History    Occupation:      Employer: [employer]   Tobacco Use    Smoking status: Never    Smokeless tobacco: Never   Vaping Use    Vaping status: Every Day    Substances: Nicotine    " Devices: Disposable, Pre-filled or refillable cartridge   Substance and Sexual Activity    Alcohol use: Yes     Alcohol/week: 6.0 standard drinks of alcohol     Types: 6 Cans of beer per week     Comment: socially on the weekends    Drug use: No    Sexual activity: Yes     Partners: Male     Birth control/protection: Condom, Nexplanon     Family History   Problem Relation Age of Onset    Hyperlipidemia Mother     Alcohol abuse Mother     Depression Mother     Anxiety disorder Mother     Hyperlipidemia Father     Hypertension Father     Alcohol abuse Father     Depression Father     Alzheimer's disease Maternal Grandmother     Colon cancer Maternal Grandfather     Breast cancer Paternal Grandmother     Heart attack Paternal Grandfather     Diabetes Neg Hx     Thyroid cancer Neg Hx     Cervical cancer Neg Hx     Uterine cancer Neg Hx     Ovarian cancer Neg Hx       Past Medical History:   Diagnosis Date    Anxiety     Depression     Sprained ankle     Streptococcal sore throat      Past Surgical History:   Procedure Laterality Date    TONSILLECTOMY        Review of Systems     The following portions of the patient's history were reviewed and updated as appropriate: allergies, current medications, past family history, past medical history, past social history, past surgical history and problem list.      Allergy:   No Known Allergies     Discontinued Medications:  There are no discontinued medications.    Current Medications:   Current Outpatient Medications   Medication Sig Dispense Refill    acetaminophen (TYLENOL) 325 MG tablet Take 2 tablets by mouth Every 6 (Six) Hours As Needed for Mild Pain .      Etonogestrel (NEXPLANON) 68 MG implant subdermal implant To be inserted one time by prescriber. Route Subdermal.      fluocinolone acetonide (DERMOTIC) 0.01 % oil otic oil Administer  into both ears 2 (Two) Times a Day. 20 mL 0    ibuprofen (ADVIL,MOTRIN) 600 MG tablet Take 1 tablet by mouth Every 6 (Six) Hours As  Needed for Mild Pain . 30 tablet 0    Multiple Vitamins-Minerals (MULTIVITAMIN ADULT PO) Take 1 tablet by mouth Daily.      omeprazole (priLOSEC) 40 MG capsule Take 1 capsule by mouth Daily. 30 capsule 1    amphetamine-dextroamphetamine XR (Adderall XR) 10 MG 24 hr capsule Take 1 capsule by mouth Every Morning 30 capsule 0     No current facility-administered medications for this visit.     MENTAL STATUS EXAM   General Appearance:  Cleanly groomed and dressed  Eye Contact:  Good eye contact  Attitude:  Cooperative  Motor Activity:  Normal gait, posture  Muscle Strength:  Normal  Speech:  Hyper talkative  Language:  Spontaneous  Mood and affect:  Anxious  Hopelessness:  Denies  Loneliness: Denies  Thought Process:  Logical, goal-directed and linear  Associations/ Thought Content:  No delusions  Hallucinations:  None  Suicidal Ideations:  Not present  Homicidal Ideation:  Not present  Sensorium:  Alert  Orientation:  Person, place and time  Immediate Recall, Recent, and Remote Memory:  Intact  Attention Span/ Concentration:  Easily distracted  Fund of Knowledge:  Appropriate for age and educational level  Intellectual Functioning:  Average range  Insight:  Fair  Judgement:  Fair  Reliability:  Fair  Impulse Control:  Fair       PHQ-9 Depression Screening  Little interest or pleasure in doing things? Several days   Feeling down, depressed, or hopeless? Over half   PHQ-2 Total Score 3   Trouble falling or staying asleep, or sleeping too much? Several days   Feeling tired or having little energy? Over half   Poor appetite or overeating? Over half   Feeling bad about yourself - or that you are a failure or have let yourself or your family down? Over half   Trouble concentrating on things, such as reading the newspaper or watching television? Almost all   Moving or speaking so slowly that other people could have noticed? Or the opposite - being so fidgety or restless that you have been moving around a lot more than usual?  Almost all   Thoughts that you would be better off dead, or of hurting yourself in some way? Not at all   PHQ-9 Total Score 16   If you checked off any problems, how difficult have these problems made it for you to do your work, take care of things at home, or get along with other people? Extremely difficult          GAD7 Documentation:  Feeling nervous, anxious or on edge 2   Not being able to stop or control worrying 1   Worrying too much about different things 2   Trouble relaxing 3   Being so restless that it is hard to sit still 3   Becoming easily annoyed or irritable 3   Feeling Afraid as if something awful might happen 1   CLAIRE Total Score 15   How difficult have these problems made it for you? Extremely difficult     Currently vapes, nicotine.     I advised Socorro of the risks of tobacco use.     Result Review:    Labs:  No visits with results within 3 Month(s) from this visit.   Latest known visit with results is:   Office Visit on 09/13/2023   Component Date Value Ref Range Status    WBC 09/13/2023 5.30  3.40 - 10.80 10*3/mm3 Final    RBC 09/13/2023 4.63  3.77 - 5.28 10*6/mm3 Final    Hemoglobin 09/13/2023 13.8  12.0 - 15.9 g/dL Final    Hematocrit 09/13/2023 41.0  34.0 - 46.6 % Final    MCV 09/13/2023 88.6  79.0 - 97.0 fL Final    MCH 09/13/2023 29.8  26.6 - 33.0 pg Final    MCHC 09/13/2023 33.7  31.5 - 35.7 g/dL Final    RDW 09/13/2023 11.6 (L)  12.3 - 15.4 % Final    Platelets 09/13/2023 319  140 - 450 10*3/mm3 Final    Neutrophil Rel % 09/13/2023 66.7  42.7 - 76.0 % Final    Lymphocyte Rel % 09/13/2023 24.0  19.6 - 45.3 % Final    Monocyte Rel % 09/13/2023 5.5  5.0 - 12.0 % Final    Eosinophil Rel % 09/13/2023 2.8  0.3 - 6.2 % Final    Basophil Rel % 09/13/2023 0.8  0.0 - 1.5 % Final    Neutrophils Absolute 09/13/2023 3.54  1.70 - 7.00 10*3/mm3 Final    Lymphocytes Absolute 09/13/2023 1.27  0.70 - 3.10 10*3/mm3 Final    Monocytes Absolute 09/13/2023 0.29  0.10 - 0.90 10*3/mm3 Final    Eosinophils  Absolute 09/13/2023 0.15  0.00 - 0.40 10*3/mm3 Final    Basophils Absolute 09/13/2023 0.04  0.00 - 0.20 10*3/mm3 Final    Immature Granulocyte Rel % 09/13/2023 0.2  0.0 - 0.5 % Final    Immature Grans Absolute 09/13/2023 0.01  0.00 - 0.05 10*3/mm3 Final    nRBC 09/13/2023 0.0  0.0 - 0.2 /100 WBC Final    Glucose 09/13/2023 90  65 - 99 mg/dL Final    BUN 09/13/2023 10  6 - 20 mg/dL Final    Creatinine 09/13/2023 0.67  0.57 - 1.00 mg/dL Final    EGFR Result 09/13/2023 125.3  >60.0 mL/min/1.73 Final    Comment: GFR Normal >60  Chronic Kidney Disease <60  Kidney Failure <15      BUN/Creatinine Ratio 09/13/2023 14.9  7.0 - 25.0 Final    Sodium 09/13/2023 138  136 - 145 mmol/L Final    Potassium 09/13/2023 4.3  3.5 - 5.2 mmol/L Final    Chloride 09/13/2023 108 (H)  98 - 107 mmol/L Final    Total CO2 09/13/2023 21.0 (L)  22.0 - 29.0 mmol/L Final    Calcium 09/13/2023 9.4  8.6 - 10.5 mg/dL Final    Total Protein 09/13/2023 6.9  6.0 - 8.5 g/dL Final    Albumin 09/13/2023 4.5  3.5 - 5.2 g/dL Final    Globulin 09/13/2023 2.4  gm/dL Final    A/G Ratio 09/13/2023 1.9  g/dL Final    Total Bilirubin 09/13/2023 0.7  0.0 - 1.2 mg/dL Final    Alkaline Phosphatase 09/13/2023 61  39 - 117 U/L Final    AST (SGOT) 09/13/2023 10  1 - 32 U/L Final    ALT (SGPT) 09/13/2023 15  1 - 33 U/L Final    Total Cholesterol 09/13/2023 204 (H)  0 - 200 mg/dL Final    Comment: Cholesterol Reference Ranges  (U.S. Department of Health and Human Services ATP III  Classifications)  Desirable          <200 mg/dL  Borderline High    200-239 mg/dL  High Risk          >240 mg/dL  Triglyceride Reference Ranges  (U.S. Department of Health and Human Services ATP III  Classifications)  Normal           <150 mg/dL  Borderline High  150-199 mg/dL  High             200-499 mg/dL  Very High        >500 mg/dL  HDL Reference Ranges  (U.S. Department of Health and Human Services ATP III  Classifications)  Low     <40 mg/dl (major risk factor for CHD)  High    >60 mg/dl  ('negative' risk factor for CHD)  LDL Reference Ranges  (U.S. Department of Health and Human Services ATP III  Classifications)  Optimal          <100 mg/dL  Near Optimal     100-129 mg/dL  Borderline High  130-159 mg/dL  High             160-189 mg/dL  Very High        >189 mg/dL      Triglycerides 09/13/2023 100  0 - 150 mg/dL Final    HDL Cholesterol 09/13/2023 54  40 - 60 mg/dL Final    VLDL Cholesterol Stefan 09/13/2023 18  5 - 40 mg/dL Final    LDL Chol Calc (NIH) 09/13/2023 132 (H)  0 - 100 mg/dL Final    LDL/HDL RATIO 09/13/2023 2.41   Final    TSH 09/13/2023 1.850  0.270 - 4.200 uIU/mL Final    Vitamin B-12 09/13/2023 450  211 - 946 pg/mL Final    Results may be falsely increased if patient taking Biotin.    Folate 09/13/2023 12.80  4.78 - 24.20 ng/mL Final    Results may be falsely increased if patient taking Biotin.       Assessment & Plan   Diagnoses and all orders for this visit:    1. ADHD (attention deficit hyperactivity disorder), combined type (Primary)  -     amphetamine-dextroamphetamine XR (Adderall XR) 10 MG 24 hr capsule; Take 1 capsule by mouth Every Morning  Dispense: 30 capsule; Refill: 0  -     ToxAssure Flex 22, Ur w/DL - Urine, Random Void  -     Ambulatory Referral to Behavioral Health    2. Encounter for long-term (current) use of medications  -     ToxAssure Flex 22, Ur w/DL - Urine, Random Void    3. Generalized anxiety disorder  -     Ambulatory Referral to Behavioral Health    4. Mild episode of recurrent major depressive disorder  -     Ambulatory Referral to Behavioral Health       Start Adderall XR 10mg daily in the morning.   Complete UDS.   Place on waiting list for therapy.     Visit Diagnoses:    ICD-10-CM ICD-9-CM   1. ADHD (attention deficit hyperactivity disorder), combined type  F90.2 314.01   2. Encounter for long-term (current) use of medications  Z79.899 V58.69   3. Generalized anxiety disorder  F41.1 300.02   4. Mild episode of recurrent major depressive disorder  F33.0  296.31       The above listed condition/conditions are no improvement (new patient) with treatment, moderate intensity.  Pt history, review of systems, medications, allergies, reviewed, patient was screened today for depression/anxiety, PHQ/CLAIRE scores reviewed.  Most recent vitals/labs reviewed.  Pt was given appropriate time to ask questions and concerns were addressed. A thorough discussion was had that included review of disease process, need for continued monitoring and additional treatment options including use of pharmacological and non-pharmacological approaches to care, decisions were made and agreed upon by patient and provider.   Discussed the risks, benefits, and potential side effects of the medications; patient ackowledged and verbally consented.     TREATMENT PLAN/GOALS: Continue supportive psychotherapy efforts and medications as indicated. Treatment and medication options discussed during today's visit. Patient ackowledged and verbally consented to continue with current treatment plan and was educated on the importance of compliance with treatment and follow-up appointments.    -Short-Term Goals: Patient will be compliant with medication management and note improvement in S/S over the next 4 to 6 weeks or at next scheduled visit.  -Long-Term Goals: Patient will be compliant with the agreed treatment plan including medication regimen & F/U appt's and deny impairment in daily functioning over the next 6 months.      CRISIS RESOURCES:    In the event you have personal crisis, there are several resources to reach someone to talk with:    988 Suicide and Crisis Lifeline  Call or text 988 or chat 988lifeline.org  [facility]'s National Helpline  3-547-459-HELP (4357)  Text your zip code to 516767 (HELP4U)  's Crisis Line  Dial 988, then press 1  Text 880932    No show policy:  We understand unexpected circumstances arise; however, anytime you miss your appointment we are unable to provide you appropriate  care.  In addition, each appointment missed could have been used to provide care for others.  We ask that you call at least 24 hours in advance to cancel or reschedule an appointment. We would like to take this opportunity to remind you of our policy stating patients who miss THREE appointments without cancelling or rescheduling 24 hours in advance of the appointment may be subject to cancellation of any further visits with our clinic. Please call [phone] to reschedule your appointment. If there are reasons that make it difficult for you to keep the appointments, please call and let us know how we can help. Please understand that medication prescribing will not continue without seeing your provider.        MEDICATION ISSUES:  INSPECT reviewed as expected    Discussed medication options and treatment plan of prescribed medication as well as the risks, benefits, and side effects including potential falls, possible impaired driving and metabolic adversities among others. Patient is agreeable to call the office with any worsening of symptoms or onset of side effects. Patient is agreeable to call 911 or go to the nearest ER should he/she begin having SI/HI. No medication side effects or related complaints today.     MEDS ORDERED DURING VISIT:  New Medications Ordered This Visit   Medications    amphetamine-dextroamphetamine XR (Adderall XR) 10 MG 24 hr capsule     Sig: Take 1 capsule by mouth Every Morning     Dispense:  30 capsule     Refill:  0       Return in about 1 month (around 1/2/2025).         This document has been electronically signed by ROBERT Abel  December 2, 2024 11:38 EST    Part of this note may be an electronic transcription/translation of spoken language to printed text using the Dragon Dictation System. Some of the data in this electronic note has been brought forward from a previous encounter, any necessary changes have been made, it has been reviewed by this APRN, and it is  accurate.

## 2024-12-02 ENCOUNTER — OFFICE VISIT (OUTPATIENT)
Dept: PSYCHIATRY | Facility: CLINIC | Age: 24
End: 2024-12-02
Payer: COMMERCIAL

## 2024-12-02 VITALS
WEIGHT: 148 LBS | HEIGHT: 67 IN | DIASTOLIC BLOOD PRESSURE: 70 MMHG | OXYGEN SATURATION: 98 % | HEART RATE: 68 BPM | SYSTOLIC BLOOD PRESSURE: 100 MMHG | BODY MASS INDEX: 23.23 KG/M2

## 2024-12-02 DIAGNOSIS — F90.2 ADHD (ATTENTION DEFICIT HYPERACTIVITY DISORDER), COMBINED TYPE: Primary | Chronic | ICD-10-CM

## 2024-12-02 DIAGNOSIS — Z79.899 ENCOUNTER FOR LONG-TERM (CURRENT) USE OF MEDICATIONS: ICD-10-CM

## 2024-12-02 DIAGNOSIS — F41.1 GENERALIZED ANXIETY DISORDER: Chronic | ICD-10-CM

## 2024-12-02 DIAGNOSIS — F33.0 MILD EPISODE OF RECURRENT MAJOR DEPRESSIVE DISORDER: Chronic | ICD-10-CM

## 2024-12-02 PROCEDURE — 90792 PSYCH DIAG EVAL W/MED SRVCS: CPT

## 2024-12-02 RX ORDER — DEXTROAMPHETAMINE SACCHARATE, AMPHETAMINE ASPARTATE MONOHYDRATE, DEXTROAMPHETAMINE SULFATE AND AMPHETAMINE SULFATE 2.5; 2.5; 2.5; 2.5 MG/1; MG/1; MG/1; MG/1
10 CAPSULE, EXTENDED RELEASE ORAL EVERY MORNING
Qty: 30 CAPSULE | Refills: 0 | Status: SHIPPED | OUTPATIENT
Start: 2024-12-02 | End: 2024-12-04 | Stop reason: SDUPTHER

## 2024-12-02 NOTE — PROGRESS NOTES
Socorro verified that she understands this is medication management  Given her the ADHD paperwork      Socorro verified her medication list  She verified she is NOT doing any CBD or THC.  She gave an adequate amount of specimen for UDS.

## 2024-12-03 ENCOUNTER — PRIOR AUTHORIZATION (OUTPATIENT)
Dept: PSYCHIATRY | Facility: CLINIC | Age: 24
End: 2024-12-03
Payer: COMMERCIAL

## 2024-12-03 NOTE — TELEPHONE ENCOUNTER
PA sent to [facility] through Cover my meds for generic Adderall 10 mg ER.    Approved  #[id number]  11/3/24 to 12/3/25    Information faxed to pharmacy

## 2024-12-04 ENCOUNTER — TELEPHONE (OUTPATIENT)
Dept: PSYCHIATRY | Facility: CLINIC | Age: 24
End: 2024-12-04
Payer: COMMERCIAL

## 2024-12-04 DIAGNOSIS — F90.2 ADHD (ATTENTION DEFICIT HYPERACTIVITY DISORDER), COMBINED TYPE: Chronic | ICD-10-CM

## 2024-12-04 RX ORDER — DEXTROAMPHETAMINE SACCHARATE, AMPHETAMINE ASPARTATE MONOHYDRATE, DEXTROAMPHETAMINE SULFATE AND AMPHETAMINE SULFATE 2.5; 2.5; 2.5; 2.5 MG/1; MG/1; MG/1; MG/1
10 CAPSULE, EXTENDED RELEASE ORAL EVERY MORNING
Qty: 30 CAPSULE | Refills: 0 | Status: SHIPPED | OUTPATIENT
Start: 2024-12-04 | End: 2025-12-04

## 2024-12-04 NOTE — TELEPHONE ENCOUNTER
Socorro called, [facility] not have the 10mg Adderall, she found it at the [facility]. Called [facility] and spoke to Regla and canceled Adderall.

## 2024-12-05 ENCOUNTER — PATIENT ROUNDING (BHMG ONLY) (OUTPATIENT)
Dept: PSYCHIATRY | Facility: CLINIC | Age: 24
End: 2024-12-05
Payer: COMMERCIAL

## 2024-12-05 NOTE — PROGRESS NOTES
A My-chart message has been sent to the patient for PATIENT ROUNDING with [facility].

## 2024-12-08 LAB
1OH-MIDAZOLAM UR QL SCN: NOT DETECTED NG/MG CREAT
6MAM UR QL SCN: NEGATIVE NG/ML
7AMINOCLONAZEPAM/CREAT UR: NOT DETECTED NG/MG CREAT
8OH-AMOXAPINE UR QL: NOT DETECTED
8OH-LOXAPINE UR QL SCN: NOT DETECTED
A-OH ALPRAZ/CREAT UR: NOT DETECTED NG/MG CREAT
A-OH-TRIAZOLAM/CREAT UR CFM: NOT DETECTED NG/MG CREAT
ALPRAZ/CREAT UR CFM: NOT DETECTED NG/MG CREAT
AMITRIP UR-MCNC: NOT DETECTED NG/ML
AMOXAPINE UR QL: NOT DETECTED
AMPHETAMINES UR QL SCN>500 NG/ML: NEGATIVE NG/ML
ANTICONVULSANTS UR: NEGATIVE
ANTIPSYCHOTICS UR: NEGATIVE
ARIPIPRAZOLE UR QL SCN: NOT DETECTED
ASENAPINE UR QL CFM: NOT DETECTED
BARBITURATES UR QL SCN: NEGATIVE NG/ML
BENZODIAZ SCN METH UR: NEGATIVE
BUPRENORPHINE UR QL SCN: NEGATIVE NG/ML
BUPROPION UR QL: NOT DETECTED
CANNABINOIDS UR QL SCN: NEGATIVE NG/ML
CARISOPRODOL UR QL: NEGATIVE NG/ML
CHLORPROMAZINE UR QL: NOT DETECTED
CITALOPRAM, UR: NOT DETECTED
CLOMIPRAMINE UR-MCNC: NOT DETECTED NG/ML
CLONAZEPAM/CREAT UR CFM: NOT DETECTED NG/MG CREAT
CLOZAPINE UR QL: NOT DETECTED
COCAINE+BZE UR QL SCN: NEGATIVE NG/ML
CREAT UR-MCNC: 189 MG/DL
DESALKYLFLURAZ/CREAT UR: NOT DETECTED NG/MG CREAT
DESIPRAMINE UR-MCNC: NOT DETECTED NG/ML
DIAZEPAM/CREAT UR: NOT DETECTED NG/MG CREAT
DOXEPIN UR-MCNC: NOT DETECTED NG/ML
DULOXETINE UR QL: NOT DETECTED
ETG ETS UR QL CFM: NORMAL
ETHANOL UR QL SCN: NORMAL NG/ML
ETHYL GLUCURONIDE UR CFM-MCNC: NORMAL NG/MG CREAT
ETHYL SULFATE UR CFM-MCNC: 4664 NG/MG CREAT
FENTANYL UR QL SCN: NEGATIVE NG/ML
FLUNITRAZEPAM UR QL SCN: NOT DETECTED NG/MG CREAT
FLUOXETINE UR QL SCN: NOT DETECTED
FLUPHENAZINE UR-MCNC: NOT DETECTED NG/ML
FLUVOXAMINE UR QL: NOT DETECTED
GABAPENTIN UR-MCNC: NEGATIVE UG/ML
HALOPERIDOL UR QL: NOT DETECTED
ILOPERIDONE UR QL CFM: NOT DETECTED
IMIPRAMINE UR-MCNC: NOT DETECTED NG/ML
KRATOM IA, UR: NEGATIVE NG/ML
LORAZEPAM/CREAT UR: NOT DETECTED NG/MG CREAT
LOXAPINE UR QL: NOT DETECTED
LURASIDONE UR QL CFM: NOT DETECTED
MAPROTILINE UR QL: NOT DETECTED
ME-PHENIDATE UR QL SCN: NEGATIVE NG/ML
MESORIDAZINE UR QL: NOT DETECTED
METHADONE UR QL SCN: NEGATIVE NG/ML
METHADONE+METAB UR QL SCN: NEGATIVE NG/ML
MIDAZOLAM/CREAT UR CFM: NOT DETECTED NG/MG CREAT
MIRTAZAPINE UR-MCNC: NOT DETECTED UG/ML
MOLINDONE UR QL SCN: NOT DETECTED
NEFAZODONE UR QL: NOT DETECTED
NORCITALOPRAM UR QL: NOT DETECTED
NORCLOMIPRAMINE UR QL: NOT DETECTED
NORCLOZAPINE UR QL: NOT DETECTED
NORDIAZEPAM/CREAT UR: NOT DETECTED NG/MG CREAT
NORDOXEPIN UR QL: NOT DETECTED
NORFLUNITRAZEPAM UR-MCNC: NOT DETECTED NG/MG CREAT
NORFLUOXETINE UR-MCNC: NOT DETECTED NG/ML
NORSERTRALINE UR QL: NOT DETECTED
NORTRIP UR-MCNC: NOT DETECTED NG/ML
ODV UR-MCNC: NOT DETECTED NG/ML
OH-BUPROPION UR-MCNC: NOT DETECTED NG/ML
OLANZAPINE UR CFM-MCNC: NOT DETECTED NG/ML
OPIATES UR SCN-MCNC: NEGATIVE NG/ML
OXAZEPAM/CREAT UR: NOT DETECTED NG/MG CREAT
OXYCODONE CTO UR SCN-MCNC: NEGATIVE NG/ML
PAROXETINE UR-MCNC: NOT DETECTED NG/L
PCP UR QL SCN: NEGATIVE NG/ML
PERPHENAZINE UR QL: NOT DETECTED
PIMOZIDE, UR: NOT DETECTED
PREGABALIN UR QL SCN: NOT DETECTED
PRESCRIBED MEDICATIONS: NORMAL
PROCHLORPERAZINE UR QL: NOT DETECTED
PROPOXYPH UR QL SCN: NEGATIVE NG/ML
PROTRIP UR QL: NOT DETECTED
QUETIAPINE CTO UR CFM-MCNC: NOT DETECTED NG/ML
RISPERIDONE UR QL: NOT DETECTED
SERTRALINE UR-MCNC: NOT DETECTED NG/ML
TAPENTADOL CTO UR SCN-MCNC: NEGATIVE NG/ML
TEMAZEPAM/CREAT UR: NOT DETECTED NG/MG CREAT
THIORIDAZINE UR-MCNC: NOT DETECTED UG/ML
THIOTHIXENE UR QL: NOT DETECTED
TRAMADOL UR QL SCN: NEGATIVE NG/ML
TRAZODONE UR QL: NOT DETECTED
TRAZODONE UR-MCNC: NOT DETECTED UG/ML
TRICYCLICS TESTED UR SCN: NEGATIVE
TRIFPERAZINE UR QL: NOT DETECTED
TRIMIPRAMINE UR QL: NOT DETECTED
VENLAFAXINE UR QL: NOT DETECTED
VILAZ UR QL SCN: NOT DETECTED
ZIPRASIDONE UR QL SCN: NOT DETECTED

## 2025-03-31 DIAGNOSIS — Z00.00 HEALTHCARE MAINTENANCE: ICD-10-CM

## 2025-03-31 DIAGNOSIS — E53.8 LOW SERUM VITAMIN B12: ICD-10-CM

## 2025-03-31 DIAGNOSIS — F33.1 MODERATE EPISODE OF RECURRENT MAJOR DEPRESSIVE DISORDER: ICD-10-CM

## 2025-03-31 DIAGNOSIS — F41.1 GENERALIZED ANXIETY DISORDER WITH PANIC ATTACKS: ICD-10-CM

## 2025-03-31 DIAGNOSIS — F41.0 GENERALIZED ANXIETY DISORDER WITH PANIC ATTACKS: ICD-10-CM

## 2025-03-31 DIAGNOSIS — K21.9 GASTROESOPHAGEAL REFLUX DISEASE, UNSPECIFIED WHETHER ESOPHAGITIS PRESENT: ICD-10-CM

## 2025-03-31 DIAGNOSIS — R14.0 POSTPRANDIAL ABDOMINAL BLOATING: ICD-10-CM

## 2025-04-01 ENCOUNTER — TELEPHONE (OUTPATIENT)
Dept: INTERNAL MEDICINE | Facility: CLINIC | Age: 25
End: 2025-04-01
Payer: COMMERCIAL

## 2025-04-01 ENCOUNTER — LAB (OUTPATIENT)
Dept: LAB | Facility: HOSPITAL | Age: 25
End: 2025-04-01
Payer: COMMERCIAL

## 2025-04-01 LAB
25(OH)D3 SERPL-MCNC: 26.7 NG/ML (ref 30–100)
ALBUMIN SERPL-MCNC: 4.6 G/DL (ref 3.5–5.2)
ALBUMIN/GLOB SERPL: 2.1 G/DL
ALP SERPL-CCNC: 65 U/L (ref 39–117)
ALT SERPL W P-5'-P-CCNC: 14 U/L (ref 1–33)
ANION GAP SERPL CALCULATED.3IONS-SCNC: 9.8 MMOL/L (ref 5–15)
AST SERPL-CCNC: 14 U/L (ref 1–32)
BASOPHILS # BLD AUTO: 0.05 10*3/MM3 (ref 0–0.2)
BASOPHILS NFR BLD AUTO: 1 % (ref 0–1.5)
BILIRUB SERPL-MCNC: 0.3 MG/DL (ref 0–1.2)
BUN SERPL-MCNC: 10 MG/DL (ref 6–20)
BUN/CREAT SERPL: 13.2 (ref 7–25)
CALCIUM SPEC-SCNC: 9.1 MG/DL (ref 8.6–10.5)
CHLORIDE SERPL-SCNC: 108 MMOL/L (ref 98–107)
CHOLEST SERPL-MCNC: 159 MG/DL (ref 0–200)
CO2 SERPL-SCNC: 22.2 MMOL/L (ref 22–29)
CREAT SERPL-MCNC: 0.76 MG/DL (ref 0.57–1)
DEPRECATED RDW RBC AUTO: 39.2 FL (ref 37–54)
EGFRCR SERPLBLD CKD-EPI 2021: 111.7 ML/MIN/1.73
EOSINOPHIL # BLD AUTO: 0.19 10*3/MM3 (ref 0–0.4)
EOSINOPHIL NFR BLD AUTO: 3.9 % (ref 0.3–6.2)
ERYTHROCYTE [DISTWIDTH] IN BLOOD BY AUTOMATED COUNT: 11.9 % (ref 12.3–15.4)
FOLATE SERPL-MCNC: 12.3 NG/ML (ref 4.78–24.2)
GLOBULIN UR ELPH-MCNC: 2.2 GM/DL
GLUCOSE SERPL-MCNC: 99 MG/DL (ref 65–99)
HCT VFR BLD AUTO: 42.4 % (ref 34–46.6)
HDLC SERPL-MCNC: 47 MG/DL (ref 40–60)
HGB BLD-MCNC: 13.7 G/DL (ref 12–15.9)
IMM GRANULOCYTES # BLD AUTO: 0.01 10*3/MM3 (ref 0–0.05)
IMM GRANULOCYTES NFR BLD AUTO: 0.2 % (ref 0–0.5)
LDLC SERPL CALC-MCNC: 95 MG/DL (ref 0–100)
LDLC/HDLC SERPL: 2.01 {RATIO}
LYMPHOCYTES # BLD AUTO: 1.64 10*3/MM3 (ref 0.7–3.1)
LYMPHOCYTES NFR BLD AUTO: 33.7 % (ref 19.6–45.3)
MCH RBC QN AUTO: 29 PG (ref 26.6–33)
MCHC RBC AUTO-ENTMCNC: 32.3 G/DL (ref 31.5–35.7)
MCV RBC AUTO: 89.6 FL (ref 79–97)
MONOCYTES # BLD AUTO: 0.34 10*3/MM3 (ref 0.1–0.9)
MONOCYTES NFR BLD AUTO: 7 % (ref 5–12)
NEUTROPHILS NFR BLD AUTO: 2.64 10*3/MM3 (ref 1.7–7)
NEUTROPHILS NFR BLD AUTO: 54.2 % (ref 42.7–76)
NRBC BLD AUTO-RTO: 0 /100 WBC (ref 0–0.2)
PLATELET # BLD AUTO: 313 10*3/MM3 (ref 140–450)
PMV BLD AUTO: 10 FL (ref 6–12)
POTASSIUM SERPL-SCNC: 3.8 MMOL/L (ref 3.5–5.2)
PROT SERPL-MCNC: 6.8 G/DL (ref 6–8.5)
RBC # BLD AUTO: 4.73 10*6/MM3 (ref 3.77–5.28)
SODIUM SERPL-SCNC: 140 MMOL/L (ref 136–145)
TRIGL SERPL-MCNC: 88 MG/DL (ref 0–150)
TSH SERPL DL<=0.05 MIU/L-ACNC: 2.06 UIU/ML (ref 0.27–4.2)
VIT B12 BLD-MCNC: 436 PG/ML (ref 211–946)
VLDLC SERPL-MCNC: 17 MG/DL (ref 5–40)
WBC NRBC COR # BLD AUTO: 4.87 10*3/MM3 (ref 3.4–10.8)

## 2025-04-01 PROCEDURE — 86258 DGP ANTIBODY EACH IG CLASS: CPT | Performed by: NURSE PRACTITIONER

## 2025-04-01 PROCEDURE — 82746 ASSAY OF FOLIC ACID SERUM: CPT | Performed by: NURSE PRACTITIONER

## 2025-04-01 PROCEDURE — 82607 VITAMIN B-12: CPT | Performed by: NURSE PRACTITIONER

## 2025-04-01 PROCEDURE — 82784 ASSAY IGA/IGD/IGG/IGM EACH: CPT | Performed by: NURSE PRACTITIONER

## 2025-04-01 PROCEDURE — 80061 LIPID PANEL: CPT | Performed by: NURSE PRACTITIONER

## 2025-04-01 PROCEDURE — 86231 EMA EACH IG CLASS: CPT | Performed by: NURSE PRACTITIONER

## 2025-04-01 PROCEDURE — 86364 TISS TRNSGLTMNASE EA IG CLAS: CPT | Performed by: NURSE PRACTITIONER

## 2025-04-01 PROCEDURE — 36415 COLL VENOUS BLD VENIPUNCTURE: CPT

## 2025-04-01 PROCEDURE — 82306 VITAMIN D 25 HYDROXY: CPT | Performed by: NURSE PRACTITIONER

## 2025-04-01 PROCEDURE — 80050 GENERAL HEALTH PANEL: CPT | Performed by: NURSE PRACTITIONER

## 2025-04-01 NOTE — TELEPHONE ENCOUNTER
Caller: Socorro Chaney    Relationship: Self    Best call back number: [phone]     What orders are you requesting (i.e. lab or imaging): PREGNANCY TEST    In what timeframe would the patient need to come in: ASAP    Where will you receive your lab/imaging services: IN [place]    Additional notes: PLEASE CALL ONCE PLACED SO SHE CAN GO

## 2025-04-02 PROBLEM — E55.9 VITAMIN D INSUFFICIENCY: Status: ACTIVE | Noted: 2025-04-02

## 2025-04-02 LAB
ENDOMYSIUM IGA SER QL: NEGATIVE
GLIADIN PEPTIDE IGA SER-ACNC: 3 UNITS (ref 0–19)
GLIADIN PEPTIDE IGG SER-ACNC: 2 UNITS (ref 0–19)
IGA SERPL-MCNC: 143 MG/DL (ref 87–352)
TTG IGA SER-ACNC: <2 U/ML (ref 0–3)
TTG IGG SER-ACNC: 5 U/ML (ref 0–5)